# Patient Record
Sex: FEMALE | Race: WHITE | Employment: FULL TIME | URBAN - METROPOLITAN AREA
[De-identification: names, ages, dates, MRNs, and addresses within clinical notes are randomized per-mention and may not be internally consistent; named-entity substitution may affect disease eponyms.]

---

## 2020-01-06 ENCOUNTER — APPOINTMENT (OUTPATIENT)
Dept: CT IMAGING | Facility: CLINIC | Age: 35
End: 2020-01-06
Attending: EMERGENCY MEDICINE
Payer: COMMERCIAL

## 2020-01-06 ENCOUNTER — HOSPITAL ENCOUNTER (OUTPATIENT)
Facility: CLINIC | Age: 35
Setting detail: OBSERVATION
Discharge: HOME | End: 2020-01-08
Attending: EMERGENCY MEDICINE | Admitting: INTERNAL MEDICINE
Payer: COMMERCIAL

## 2020-01-06 DIAGNOSIS — R79.89 ELEVATED TROPONIN: ICD-10-CM

## 2020-01-06 DIAGNOSIS — R07.9 CHEST PAIN, UNSPECIFIED TYPE: Primary | ICD-10-CM

## 2020-01-06 DIAGNOSIS — R07.9 CHEST PAIN, UNSPECIFIED TYPE: ICD-10-CM

## 2020-01-06 LAB
ALBUMIN SERPL-MCNC: 4.2 G/DL (ref 3.4–5)
ALP SERPL-CCNC: 53 U/L (ref 40–150)
ALT SERPL W P-5'-P-CCNC: 24 U/L (ref 0–50)
ANION GAP SERPL CALCULATED.3IONS-SCNC: 3 MMOL/L (ref 3–14)
AST SERPL W P-5'-P-CCNC: 23 U/L (ref 0–45)
B-HCG FREE SERPL-ACNC: <5 IU/L
BASOPHILS # BLD AUTO: 0 10E9/L (ref 0–0.2)
BASOPHILS NFR BLD AUTO: 1 %
BILIRUB SERPL-MCNC: 0.7 MG/DL (ref 0.2–1.3)
BUN SERPL-MCNC: 16 MG/DL (ref 7–30)
CALCIUM SERPL-MCNC: 8.9 MG/DL (ref 8.5–10.1)
CHLORIDE SERPL-SCNC: 113 MMOL/L (ref 94–109)
CO2 SERPL-SCNC: 25 MMOL/L (ref 20–32)
CREAT SERPL-MCNC: 1.01 MG/DL (ref 0.52–1.04)
D DIMER PPP FEU-MCNC: 0.6 UG/ML FEU (ref 0–0.5)
DIFFERENTIAL METHOD BLD: ABNORMAL
EOSINOPHIL # BLD AUTO: 0.1 10E9/L (ref 0–0.7)
EOSINOPHIL NFR BLD AUTO: 1.8 %
ERYTHROCYTE [DISTWIDTH] IN BLOOD BY AUTOMATED COUNT: 12.3 % (ref 10–15)
GFR SERPL CREATININE-BSD FRML MDRD: 73 ML/MIN/{1.73_M2}
GLUCOSE SERPL-MCNC: 96 MG/DL (ref 70–99)
HCT VFR BLD AUTO: 37.9 % (ref 35–47)
HGB BLD-MCNC: 13 G/DL (ref 11.7–15.7)
IMM GRANULOCYTES # BLD: 0 10E9/L (ref 0–0.4)
IMM GRANULOCYTES NFR BLD: 0 %
INTERPRETATION ECG - MUSE: NORMAL
LIPASE SERPL-CCNC: 95 U/L (ref 73–393)
LYMPHOCYTES # BLD AUTO: 1 10E9/L (ref 0.8–5.3)
LYMPHOCYTES NFR BLD AUTO: 25.6 %
MCH RBC QN AUTO: 30.2 PG (ref 26.5–33)
MCHC RBC AUTO-ENTMCNC: 34.3 G/DL (ref 31.5–36.5)
MCV RBC AUTO: 88 FL (ref 78–100)
MONOCYTES # BLD AUTO: 0.5 10E9/L (ref 0–1.3)
MONOCYTES NFR BLD AUTO: 13.3 %
NEUTROPHILS # BLD AUTO: 2.3 10E9/L (ref 1.6–8.3)
NEUTROPHILS NFR BLD AUTO: 58.3 %
NRBC # BLD AUTO: 0 10*3/UL
NRBC BLD AUTO-RTO: 0 /100
PLATELET # BLD AUTO: 172 10E9/L (ref 150–450)
POTASSIUM SERPL-SCNC: 3.4 MMOL/L (ref 3.4–5.3)
PROT SERPL-MCNC: 7.2 G/DL (ref 6.8–8.8)
RBC # BLD AUTO: 4.31 10E12/L (ref 3.8–5.2)
SODIUM SERPL-SCNC: 141 MMOL/L (ref 133–144)
TROPONIN I SERPL-MCNC: 0.06 UG/L (ref 0–0.04)
TROPONIN I SERPL-MCNC: 0.07 UG/L (ref 0–0.04)
TROPONIN I SERPL-MCNC: 0.09 UG/L (ref 0–0.04)
WBC # BLD AUTO: 3.9 10E9/L (ref 4–11)

## 2020-01-06 PROCEDURE — 25800030 ZZH RX IP 258 OP 636: Performed by: EMERGENCY MEDICINE

## 2020-01-06 PROCEDURE — 84484 ASSAY OF TROPONIN QUANT: CPT | Mod: 91 | Performed by: NURSE PRACTITIONER

## 2020-01-06 PROCEDURE — 25000128 H RX IP 250 OP 636: Performed by: EMERGENCY MEDICINE

## 2020-01-06 PROCEDURE — 84702 CHORIONIC GONADOTROPIN TEST: CPT

## 2020-01-06 PROCEDURE — 84484 ASSAY OF TROPONIN QUANT: CPT | Performed by: EMERGENCY MEDICINE

## 2020-01-06 PROCEDURE — 99203 OFFICE O/P NEW LOW 30 MIN: CPT | Performed by: INTERNAL MEDICINE

## 2020-01-06 PROCEDURE — 99285 EMERGENCY DEPT VISIT HI MDM: CPT | Mod: 25

## 2020-01-06 PROCEDURE — 36415 COLL VENOUS BLD VENIPUNCTURE: CPT | Performed by: NURSE PRACTITIONER

## 2020-01-06 PROCEDURE — 99220 ZZC INITIAL OBSERVATION CARE,LEVL III: CPT | Performed by: NURSE PRACTITIONER

## 2020-01-06 PROCEDURE — 96361 HYDRATE IV INFUSION ADD-ON: CPT

## 2020-01-06 PROCEDURE — 71275 CT ANGIOGRAPHY CHEST: CPT

## 2020-01-06 PROCEDURE — 96360 HYDRATION IV INFUSION INIT: CPT | Mod: 59

## 2020-01-06 PROCEDURE — 83690 ASSAY OF LIPASE: CPT | Performed by: EMERGENCY MEDICINE

## 2020-01-06 PROCEDURE — 80053 COMPREHEN METABOLIC PANEL: CPT | Performed by: EMERGENCY MEDICINE

## 2020-01-06 PROCEDURE — G0378 HOSPITAL OBSERVATION PER HR: HCPCS

## 2020-01-06 PROCEDURE — 85025 COMPLETE CBC W/AUTO DIFF WBC: CPT | Performed by: EMERGENCY MEDICINE

## 2020-01-06 PROCEDURE — 93005 ELECTROCARDIOGRAM TRACING: CPT

## 2020-01-06 PROCEDURE — 85379 FIBRIN DEGRADATION QUANT: CPT | Performed by: EMERGENCY MEDICINE

## 2020-01-06 PROCEDURE — 25000125 ZZHC RX 250: Performed by: EMERGENCY MEDICINE

## 2020-01-06 RX ORDER — IOPAMIDOL 755 MG/ML
54 INJECTION, SOLUTION INTRAVASCULAR ONCE
Status: COMPLETED | OUTPATIENT
Start: 2020-01-06 | End: 2020-01-06

## 2020-01-06 RX ORDER — AMLODIPINE BESYLATE 10 MG/1
10 TABLET ORAL DAILY
Status: DISCONTINUED | OUTPATIENT
Start: 2020-01-07 | End: 2020-01-08 | Stop reason: HOSPADM

## 2020-01-06 RX ORDER — ASPIRIN 81 MG/1
324 TABLET, CHEWABLE ORAL ONCE
Status: DISCONTINUED | OUTPATIENT
Start: 2020-01-06 | End: 2020-01-06

## 2020-01-06 RX ORDER — NITROGLYCERIN 0.4 MG/1
0.4 TABLET SUBLINGUAL EVERY 5 MIN PRN
Status: DISCONTINUED | OUTPATIENT
Start: 2020-01-06 | End: 2020-01-08 | Stop reason: HOSPADM

## 2020-01-06 RX ORDER — NALOXONE HYDROCHLORIDE 0.4 MG/ML
.1-.4 INJECTION, SOLUTION INTRAMUSCULAR; INTRAVENOUS; SUBCUTANEOUS
Status: DISCONTINUED | OUTPATIENT
Start: 2020-01-06 | End: 2020-01-08 | Stop reason: HOSPADM

## 2020-01-06 RX ORDER — LIDOCAINE 40 MG/G
CREAM TOPICAL
Status: DISCONTINUED | OUTPATIENT
Start: 2020-01-06 | End: 2020-01-08 | Stop reason: HOSPADM

## 2020-01-06 RX ORDER — POLYETHYLENE GLYCOL 3350 17 G/17G
1 POWDER, FOR SOLUTION ORAL DAILY PRN
COMMUNITY

## 2020-01-06 RX ORDER — ACETAMINOPHEN 325 MG/1
650 TABLET ORAL EVERY 4 HOURS PRN
Status: DISCONTINUED | OUTPATIENT
Start: 2020-01-06 | End: 2020-01-08

## 2020-01-06 RX ORDER — AMLODIPINE BESYLATE 10 MG/1
10 TABLET ORAL DAILY
COMMUNITY
Start: 2019-10-31

## 2020-01-06 RX ORDER — ASPIRIN 81 MG/1
81 TABLET ORAL DAILY
Status: DISCONTINUED | OUTPATIENT
Start: 2020-01-07 | End: 2020-01-08 | Stop reason: HOSPADM

## 2020-01-06 RX ORDER — ASPIRIN 81 MG/1
81 TABLET ORAL DAILY
COMMUNITY

## 2020-01-06 RX ORDER — MEDROXYPROGESTERONE ACETATE 150 MG/ML
150 INJECTION, SUSPENSION INTRAMUSCULAR
COMMUNITY
Start: 2019-03-11

## 2020-01-06 RX ORDER — ACETAMINOPHEN 650 MG/1
650 SUPPOSITORY RECTAL EVERY 4 HOURS PRN
Status: DISCONTINUED | OUTPATIENT
Start: 2020-01-06 | End: 2020-01-08 | Stop reason: HOSPADM

## 2020-01-06 RX ORDER — ALUMINA, MAGNESIA, AND SIMETHICONE 2400; 2400; 240 MG/30ML; MG/30ML; MG/30ML
30 SUSPENSION ORAL EVERY 4 HOURS PRN
Status: DISCONTINUED | OUTPATIENT
Start: 2020-01-06 | End: 2020-01-08 | Stop reason: HOSPADM

## 2020-01-06 RX ORDER — MULTIPLE VITAMINS W/ MINERALS TAB 9MG-400MCG
1 TAB ORAL DAILY
COMMUNITY

## 2020-01-06 RX ADMIN — SODIUM CHLORIDE 1000 ML: 9 INJECTION, SOLUTION INTRAVENOUS at 06:34

## 2020-01-06 RX ADMIN — IOPAMIDOL 54 ML: 755 INJECTION, SOLUTION INTRAVENOUS at 07:47

## 2020-01-06 RX ADMIN — SODIUM CHLORIDE 82 ML: 9 INJECTION, SOLUTION INTRAVENOUS at 07:47

## 2020-01-06 ASSESSMENT — MIFFLIN-ST. JEOR: SCORE: 1162.21

## 2020-01-06 NOTE — ED PROVIDER NOTES
History     Chief Complaint:  Chest Pain      HPI   Thao Combs is a 34 year old female with a history of HTN who presents to the emergency department via EMS for evaluation of chest pain. 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 She subsequently began to have bilateral tinnitus, chills, and lightheadedness. Patient then took her blood pressure medication but continued to have pain. She then called EMS at 520am, who gave her aspirin and sublingual nitroglycerin, single dose. She denies leg swelling and history of blood clots. Of note patient had a left kidney biopsy on 12/24/19. Also of note patient reports having high blood pressure for 2-3 years.  She specifically denies any prior history of cardiac disease.  There is some question of whether she has lupus anticoagulant.  Symptoms lasted about 15 minutes before resolving.  No prior similar symptoms with activity.    Allergies:  Estrogens  Nitrofurantoin    Medications:    amlodipine  medroxyprogesterone  aspirin   etonogestrel    Past Medical History:    Anti-phospholipid antibody syndrome  HTN  Lupus  UTI  nexplanon inserted    Past Surgical History:    Appendectomy  Colposcopy cervix, biopsy cervix, endocervical curettage, combined  Cervical leep tx  Lipoma back  Septoplasty  Tonsillectomy    Family History:    Depression - mother  Heart disease - father    Social History:  Smoking status: former smoker  Alcohol use: yes  Drug use: no  The patient presents to the emergency department via EMS.  Marital Status:  Single   Patient works at [employer].    Review of Systems   All other systems reviewed and are negative.    Physical Exam     Patient Vitals for the past 24 hrs:   BP Temp Temp src Pulse Heart Rate Resp SpO2 Height Weight   01/06/20 0800 (!) 126/91 -- -- 87 90 20 100 % -- --   01/06/20 0745 (!) 131/101 -- -- -- --  "-- -- -- --   01/06/20 0730 -- -- -- -- 64 24 98 % -- --   01/06/20 0700 122/89 -- -- 71 67 10 99 % -- --   01/06/20 0618 (!) 126/98 -- -- 75 75 10 97 % -- --   01/06/20 0603 (!) 132/102 98.3  F (36.8  C) Oral -- 82 -- 100 % 1.575 m (5' 2\") 49.9 kg (110 lb)       Physical Exam  General:  nontoxic-appearing woman sitting upright in room 24  HENT: mucous membranes moist  CV: regular rate, regular rhythm, no lower extremity edema, no JVD, palpable symmetric radial pulses  Resp: clear throughout, normal effort, no crackles or wheezing  GI: abdomen soft and nontender, no guarding, negative Garsia's sign  MSK: no bony tenderness to chest  Skin: appropriately warm and dry, no erythema or vesicles to chest wall  Neuro: alert, clear speech, oriented   Psych: cooperative     Emergency Department Course   ECG (6:07:47):  Indication: Chest Pain  Rate 63 bpm. IN interval 126. QRS duration 88. QT/QTc 404/413. P-R-T axes 58 52 45. Normal sinus rhythm. IRBBB. Interpreted at 0725 by Zuhair Pena MD.    Imaging:  Radiographic findings were communicated with the patient who voiced understanding of the findings.  CT Chest Pulmonary embolism w/ contrast   IMPRESSION: Unremarkable CT scan of the chest. No evidence of  pulmonary embolism. as per radiology.       Laboratory:  CBC: WBC: 3.9 (L), HGB: 13.0, PLT: 172  CMP: Glucose 96, chloride 113 (H), o/w WNL (Creatinine: 1.01)  D-dimer: 0.6 (H)  0629 Troponin: 0.061 (H)  Lipase: 95  ISTAT HCG quantitative pregnancy POCT: <5.0    Interventions:  634 NS 1000 mL IV    Emergency Department Course:  Nursing notes and vitals reviewed. (836) I performed an exam of the patient as documented above.     IV inserted. Medicine administered as documented above. Blood drawn. This was sent to the lab for further testing, results above.     The patient was sent for a chest CT while in the emergency department, findings above.     I performed electronic chart review in [facility].  The patient was " placed on continuous cardiac and pulse ox monitoring.    An EKG was recorded. Results as noted above.     813 I rechecked the patient and discussed the results of her workup thus far.     818  I consulted with Dr. Alberts of the hospitalist services. He is in agreement to accept the patient for admission.    Findings and plan explained to the Patient who consents to admission. Discussed the patient with Dr. Alberts, who will admit the patient to a obs bed for further monitoring, evaluation, and treatment.      Impression & Plan    Medical Decision Making:  While her description of today's acute chest pain episode is not highly suspicious for an acute coronary syndrome, especially given her fairly young age, her troponin is minimally elevated, and this was drawn quite soon after onset of symptoms.  She is feeling well at this time, without ongoing symptoms.   alternate etiologies were considered including pulmonary embolism though CT of the chest is reassuring in this regard.  No signs of structural disease such as pneumothorax or pneumonia.  I do not think that she requires heparinization at this time, and she received aspirin by EMS.  However, given her abnormal troponin, in the setting of history of hypertension and possible rheumatologic disease, I think that hospitalization for serial troponins and consideration of further provocative cardiac testing is indicated.  These findings and the rationale for hospitalization were discussed with the patient who consents.  She was admitted to a monitored bed under the care of the hospitalist service.    Diagnosis:    ICD-10-CM    1. Chest pain, unspecified type R07.9    2. Elevated troponin R79.89        Disposition:  Admitted to obs    Timmy Trejo  1/6/2020    EMERGENCY DEPARTMENT  Scribe Disclosure:  I, Timmy Trejo, am serving as a scribe at 7:26 AM on 1/6/2020 to document services personally performed by Zuhair Pena MD based on my observations and the  provider's statements to me.     This record was created at least in part using electronic voice recognition software, so please excuse any typographical errors.       Zuhair Pena MD  01/06/20 8349

## 2020-01-06 NOTE — ED NOTES
"[facility]  ED Nurse Handoff Report    ED Chief complaint: Chest Pain      ED Diagnosis:   Final diagnoses:   Chest pain, unspecified type   Elevated troponin       Code Status: To be addressed by admitting MD.     Allergies:   Allergies   Allergen Reactions     Bee Venom Swelling     Bees      Blood-Group Specific Substance Other (See Comments)     Patient has a Non-Specific Antibody.  Blood products may be delayed.     Estrogens      Nitrofurantoin Nausea and Vomiting     Wasp Venom Protein      Other reaction(s): Edema  large localized reaction       Patient Story: Pt presents to the ED for chest pain. 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 Pt then had a near syncopal episode.   Focused Assessment: Pt reported SOB and CP on arrival to ED. Pt received ASA and nitroglycerin from EMS. Pt repots taking an amlodipine daily for HTN. Pt does take hormonal birth control. Pt has an elevated D-dimer: 0.6. Pt has an elevated troponin: 0.061. Pt in line for CT of chest. Pt is alert and oriented x4. Pt able to ambulate independently in ED.      Treatments and/or interventions provided: Labs, CT, continuous cardiac monitoring.   Patient's response to treatments and/or interventions: Pt reports decreased pain.     To be done/followed up on inpatient unit:  Cardiac monitoring, repeat labs.     Does this patient have any cognitive concerns?: NA    Activity level - Baseline/Home:  Independent  Activity Level - Current:   Independent    Patient's Preferred language: English   Needed?: No    Isolation: None  Infection: Not Applicable  Bariatric?: No    Vital Signs:   Vitals:    01/06/20 0603 01/06/20 0618 01/06/20 0700   BP: (!) 132/102 (!) 126/98 122/89   Pulse:  75 71   Resp:  10 10   Temp: 98.3  F (36.8  C)     TempSrc: Oral     SpO2: 100% 97% 99%   Weight: 49.9 kg (110 lb)     Height: 1.575 m (5' 2\")         Cardiac Rhythm:Cardiac Rhythm: " Normal sinus rhythm    Was the PSS-3 completed:   Yes  What interventions are required if any?  Frequent rounding             Family Comments: No family at bedside in ED  OBS brochure/video discussed/provided to patient/family: Yes              Name of person given brochure if not patient: NA              Relationship to patient: NA    For the majority of the shift this patient's behavior was Green.   Behavioral interventions performed were Frequent rounding.    ED NURSE PHONE NUMBER: [id number]

## 2020-01-06 NOTE — H&P
[facility]    History and Physical - Hospitalist Service       Date of Admission:  1/6/2020    Assessment & Plan   Thao Combs is a 34 year old female with a past medical history of hypertension, CKD stage II, and antiphospholipid syndrome admitted on 1/6/2020 with chest pain and elevation in troponin.     Chest Pain  Troponin 0.061. D-dimer 0.6. CT chest for PE protocol negative  EKG reviewed, incomplete RBBB.  Patient reports a sudden onset stabbing chest pain which was localized to midsternal area which slightly radiated to the right side of the sternum.  Patient reports she tried breathing exercises to alleviate symptoms when she became hot, diaphoretic, developed tinnitus, and became dizzy.  At which point, the patient reports she started to panic and felt as if her heart were racing and she became short of breath.  At this time she dialed 911.  On EMS arrival she was provided with aspirin 324 mg p.o. and nitroglycerin spray with relief in symptoms.  Of note, the patient reports she was lifting weights on Saturday. She completed a chest routine which is unusual for the patient. The pain is not reproducible on examination.  -Admit to OBS  -Telemetry monitoring  -Serial Troponin  -No caffeine diet  -Nuclear medicine exercise stress test  -Nitroglycerin subinguinal 0.4mg PO PRN for chest pain   -ASA 81mg PO daily    Hypertension, history of   -Continue PTA Amlodipine 10mg PO daily    CKD, stage II  --Proteinuria  --Kidney biopsy 12/24/2019  Biopsy results pending. Patient has an outpatient appt with her nephrologist on 1/8/2020. Patient reports preliminary results were called to her and negative.  Renal function at baseline Creatinine 1.01; GFR 73  -Avoid NSAIDS    Autoimmune process  --Positive Lupus anticoagulant  --Questionable Anti-phospholipid syndrome  Patient diagnosis with anti-phospholipid syndrome at 19. However, patient follows Dr. Russell, rheumatology, outpatient per note on  10/31/2019 he does not believe that she has anti-phospholipid syndrome. LORENA negative, normal C3, C4, negative anti-phospholipid antibiody. Positive lupus anticoagulant.  She did have a stillbirth at 24 weeks gestation in 2012. Currently, using depo injection for birth control.  -Continue PTA ASA 81mg PO daily       Diet: Regular diet, no caffeine  DVT Prophylaxis: Low Risk/Ambulatory with no VTE prophylaxis indicated  Zamora Catheter: not present  Code Status: Full    Disposition Plan   Expected discharge: Tomorrow, recommended to prior living arrangement once adequate pain management/ tolerating PO medications and and completion of stress test..  Entered: CLYDE Meza CNP 01/06/2020, 8:22 AM     The patient's care was discussed with the Attending Physician, Dr. Alberts.    CLYDE Meza CNP  [facility]    ______________________________________________________________________    Chief Complaint   Chest pain    History is obtained from the patient and electronic health record    History of Present Illness   Thao Combs is a 34 year old female with a past medical history of hypertension, CKD stage II, and antiphospholipid syndrome admitted on 1/6/2020 with chest pain and elevation in troponin. 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  Patient reports she tried breathing exercises to alleviate symptoms when she became hot, diaphoretic, developed tinnitus, and became dizzy.  At which point, the patient reports she started to panic and felt as if her heart were racing and she became short of breath and she called 911.  On EMS arrival she was provided with aspirin 324 mg p.o. and nitroglycerin spray with relief in symptoms.  The patient reports that she does exercise frequently which includes weightlifting routines.   She states Saturday she completed her weightlifting routine for chest muscles which is an abnormal routine for her, however the pain is not reproducible.  Patient does endorse intermittent heartburn and nausea for which she will occasionally take omeprazole.  She reports she has not taken any omeprazole recently.  She denies any melena or bright red blood in stool. She denies vomiting, diarrhea, fever, chills, shortness of breath, or decrease in exercise tolerance.    Review of Systems    The 10 point Review of Systems is negative other than noted in the HPI or here.     Past Medical History    I have reviewed this patient's medical history and updated it with pertinent information if needed.   Past Medical History:   Diagnosis Date     Anti-phospholipid antibody syndrome (H)      Contraception     h/o iud     Hypertension      Lupus (systemic lupus erythematosus) (H) 2005     Routine general medical examination at a health care facility     vivienne--primary MD/[facility]       Past Surgical History   I have reviewed this patient's surgical history and updated it with pertinent information if needed.  Past Surgical History:   Procedure Laterality Date     APPENDECTOMY       COLPOSCOPY CERVIX, BIOPSY CERVIX, ENDOCERVICAL CURETTAGE, COMBINED      polyp-benign     LEEP TX, CERVICAL       lipoma back  2013     septoplasty       TONSILLECTOMY         Social History   I have reviewed this patient's social history and updated it with pertinent information if needed.  Social History     Tobacco Use     Smoking status: Former Smoker     Smokeless tobacco: Never Used     Tobacco comment: occ hooka   Substance Use Topics     Alcohol use: Yes     Drug use: No       Family History   I have reviewed this patient's family history and updated it with pertinent information if needed.   Family History   Problem Relation Age of Onset     Gynecology Mother      Psychotic Disorder Mother         depression     Diabetes Maternal Grandmother       C.A.D. Maternal Grandmother      Psychotic Disorder Maternal Grandmother      Heart Disease Maternal Grandfather        Prior to Admission Medications   Prior to Admission Medications   Prescriptions Last Dose Informant Patient Reported? Taking?   amLODIPine (NORVASC) 10 MG tablet   Yes Yes   Sig: Take 10 mg by mouth   aspirin 81 MG EC tablet   Yes No   Sig: Take 81 mg by mouth   etonogestrel (IMPLANON/NEXPLANON) 68 MG IMPL   No No   Si each (68 mg) by Subdermal route once for 1 dose   medroxyPROGESTERone (DEPO-PROVERA) 150 MG/ML IM injection   Yes Yes   Sig: Inject 150 mg into the muscle      Facility-Administered Medications: None     Allergies   Allergies   Allergen Reactions     Bee Venom Swelling     Bees      Blood-Group Specific Substance Other (See Comments)     Patient has a Non-Specific Antibody.  Blood products may be delayed.     Estrogens      Nitrofurantoin Nausea and Vomiting     Wasp Venom Protein      Other reaction(s): Edema  large localized reaction       Physical Exam   Vital Signs: Temp: 98.3  F (36.8  C) Temp src: Oral BP: (!) 126/91 Pulse: 87 Heart Rate: 90 Resp: 20 SpO2: 100 % O2 Device: None (Room air)    Weight: 110 lbs 0 oz    Constitutional: Awake, alert, cooperative, no apparent distress.  Eyes: Conjunctiva and pupils examined and normal.  HEENT: Moist mucous membranes, normal dentition.  Respiratory: Clear to auscultation bilaterally, no crackles or wheezing.  Cardiovascular: Regular rate and rhythm, normal S1 and S2, and no murmur noted.  GI: Soft, non-distended, non-tender, normal bowel sounds.  Lymph/Hematologic: No anterior cervical or supraclavicular adenopathy.  Skin: No rashes, no cyanosis, no edema.  Musculoskeletal: No joint swelling, erythema or tenderness.  Neurologic: Cranial nerves 2-12 intact, normal strength and sensation.  Psychiatric: Alert, oriented to person, place and time, no obvious anxiety or depression.      Data   Data reviewed today: I reviewed all  medications, new labs and imaging results over the last 24 hours. I personally reviewed the EKG tracing showing incomplete RBBB.    Recent Labs   Lab 01/06/20  0629   WBC 3.9*   HGB 13.0   MCV 88         POTASSIUM 3.4   CHLORIDE 113*   CO2 25   BUN 16   CR 1.01   ANIONGAP 3   TONO 8.9   GLC 96   ALBUMIN 4.2   PROTTOTAL 7.2   BILITOTAL 0.7   ALKPHOS 53   ALT 24   AST 23   LIPASE 95   TROPI 0.061*     CT chest with contrast pulmonary emboli      FINDINGS:  No evidence of pulmonary embolism or acute thoracic aortic  abnormality. No pneumothorax. No pleural or pericardial effusion. No  pulmonary nodule or mass. No thoracic or axillary adenopathy.  Visualized portions of the upper abdomen are unremarkable. No  worrisome bone lesions.                                                                      IMPRESSION: Unremarkable CT scan of the chest. No evidence of  pulmonary embolism.

## 2020-01-06 NOTE — PROGRESS NOTES
Observation goals PRIOR TO DISCHARGE    Comments: List all goals to be met before discharge home:   - Serial troponins and stress test complete. -PENDING  - Seen and cleared by consultant if applicable -PENDING  - Adequate pain control on oral analgesia -MET  - Vital signs normal or at patient baseline -MET  - Safe disposition plan has been identified -MET  - Nurse to notify provider when observation goals have been met and patient is ready for discharge.

## 2020-01-06 NOTE — ED TRIAGE NOTES
Patient was at work working at a  of a gym when she got a sharp, stabbing pain to substernal chest and had a near syncopal episode. Pt reported SOB with the chest pain. Per EMS pt was hyperventilating. Pt received aspirin and nitroglycerin pta. Pt denies CP or SOB at this time- just reporting dizziness. She takes amlodipine daily for HTN- did not take it yesterday- no recent medication changes.

## 2020-01-06 NOTE — PROVIDER NOTIFICATION
MD Notification    Notified Person: MD    Notified Person Name: Dr. Alberts    Notification Date/Time: 1/6/20; 1615    Notification Interaction: face to face notification    Purpose of Notification: 3rd troponin elevated at 0.093    Orders Received: MD to place cards consult.     Comments:

## 2020-01-06 NOTE — PHARMACY-ADMISSION MEDICATION HISTORY
Pharmacy Medication History  Admission medication history interview status for the 1/6/2020  admission is complete. See EPIC admission navigator for prior to admission medications     Medication history sources: Patient, Surescripts and Care Everywhere  Medication history source reliability: Good  Adherence assessment: Good    Significant changes made to the medication list:        Additional medication history information:   ---  Pt stopped taking omeprazole ~ 2 weeks ago b/c she ran out.    Medication reconciliation completed by provider prior to medication history? No    Time spent in this activity: 15 minutes      Prior to Admission medications    Medication Sig Last Dose Taking? Auth Provider   amLODIPine (NORVASC) 10 MG tablet Take 10 mg by mouth daily  1/6/2020 at am Yes Reported, Patient   aspirin 81 MG EC tablet Take 81 mg by mouth daily  1/3/2020 at am Yes Reported, Patient   medroxyPROGESTERone (DEPO-PROVERA) 150 MG/ML IM injection Inject 150 mg into the muscle every 3 months  due 3rd week of Jan Yes Reported, Patient   multivitamin w/minerals (THERA-VIT-M) tablet Take 1 tablet by mouth daily chewable 1/6/2020 at am Yes Unknown, Entered By History   omeprazole (PRILOSEC) 20 MG DR capsule Take 20 mg by mouth daily 2 weeks ago Yes Unknown, Entered By History   polyethylene glycol (MIRALAX/GLYCOLAX) packet Take 1 packet by mouth daily as needed for constipation Past Week Yes Unknown, Entered By History

## 2020-01-06 NOTE — PROGRESS NOTES
MD Notification    Notified Person: MD    Notified Person Name: MD Aristeo    Notification Date/Time: 1/6/2020 2:02    Notification Interaction: web paged    Purpose of Notification: re: Stress test and Echo    Orders Received:    Comments: Patient wants to go home and do the tests as an OP. Pt. and mother informed of the Troponin trending up.

## 2020-01-06 NOTE — PROGRESS NOTES
RECEIVING UNIT ED HANDOFF REVIEW    ED Nurse Handoff Report was reviewed by: Oswaldo Gonzalez RN on January 6, 2020 at 8:59 AM

## 2020-01-06 NOTE — PROGRESS NOTES
END OF SHIFT REPORT :          DATE & TIME: January 6, 2020        SHIFT: 7 AM- 3 PM     Thao Combs [id number] 34 year old female was admitted  on 1/6/2020  due to Chest Pain    Patient is:Full Code. ISOLATION:No. : No. Diet: Regular No Caffeine Diet    Patient's recent vital signs were:   Temp: 98.6  F (37  C) BP: 111/76 Pulse: 90 Heart Rate: 87 Resp: 20 ,   SpO2: 98 %,O2 Device: None (Room air)     Patient is alert and oriented x  , scores green on behavior & aggression tool color. CI WA is 0. Patient is up with Independent to the bathroom .  Skin is intact, IV is SL,and is  not  in pain.  Patient has a no drain and is on telemetry in SR rhythm.    ABNORMAL LAB Troponin slightly elevated x 2; d-dimer slightly elevated  CONSULT/S: none  GOALS/PLAN: ECHO today

## 2020-01-06 NOTE — PLAN OF CARE
Observation goals PRIOR TO DISCHARGE     Comments: List all goals to be met before discharge home:   - Serial troponins and stress test complete: Not met, stress test in the am.   - Seen and cleared by consultant if applicable: Not met  - Adequate pain control on oral analgesia: Met  - Vital signs normal or at patient baseline: Met  - Safe disposition plan has been identified: Not met  - Nurse to notify provider when observation goals have been met and patient is ready for discharge.

## 2020-01-06 NOTE — PROGRESS NOTES
Thao Combs, 34 year old,female  1/6/2020 due to Chest Pain  .   Full Code. ISOLATION:No. : No. LEVEL: Independent arrived from emergency Room via cart accompanied by NA.  Patient was transferred to bed and was oriented to room and controls.  Observation welcome packet was given. Belongings clothing remained with patient. Vital signs stable.

## 2020-01-07 ENCOUNTER — APPOINTMENT (OUTPATIENT)
Dept: CARDIOLOGY | Facility: CLINIC | Age: 35
End: 2020-01-07
Attending: INTERNAL MEDICINE
Payer: COMMERCIAL

## 2020-01-07 ENCOUNTER — APPOINTMENT (OUTPATIENT)
Dept: CARDIOLOGY | Facility: CLINIC | Age: 35
End: 2020-01-07
Attending: PHYSICIAN ASSISTANT
Payer: COMMERCIAL

## 2020-01-07 LAB
BUN SERPL-MCNC: 16 MG/DL (ref 7–30)
CALCIUM SERPL-MCNC: 8.7 MG/DL (ref 8.5–10.1)
CHLORIDE SERPL-SCNC: 110 MMOL/L (ref 94–109)
CHOLEST SERPL-MCNC: 129 MG/DL
CO2 SERPL-SCNC: 21 MMOL/L (ref 20–32)
CREAT SERPL-MCNC: 0.97 MG/DL (ref 0.52–1.04)
GFR SERPL CREATININE-BSD FRML MDRD: 77 ML/MIN/{1.73_M2}
GLUCOSE SERPL-MCNC: 91 MG/DL (ref 70–99)
HDLC SERPL-MCNC: 48 MG/DL
LDLC SERPL CALC-MCNC: 73 MG/DL
MAGNESIUM SERPL-MCNC: 2.1 MG/DL (ref 1.6–2.3)
NONHDLC SERPL-MCNC: 81 MG/DL
POTASSIUM SERPL-SCNC: 4.2 MMOL/L (ref 3.4–5.3)
SODIUM SERPL-SCNC: 139 MMOL/L (ref 133–144)
TRIGL SERPL-MCNC: 41 MG/DL
TROPONIN I SERPL-MCNC: 0.1 UG/L (ref 0–0.04)
TSH SERPL DL<=0.005 MIU/L-ACNC: 2.62 MU/L (ref 0.4–4)

## 2020-01-07 PROCEDURE — G0378 HOSPITAL OBSERVATION PER HR: HCPCS

## 2020-01-07 PROCEDURE — 99214 OFFICE O/P EST MOD 30 MIN: CPT | Mod: 25 | Performed by: INTERNAL MEDICINE

## 2020-01-07 PROCEDURE — 25000128 H RX IP 250 OP 636: Performed by: INTERNAL MEDICINE

## 2020-01-07 PROCEDURE — 25500064 ZZH RX 255 OP 636: Performed by: INTERNAL MEDICINE

## 2020-01-07 PROCEDURE — 84484 ASSAY OF TROPONIN QUANT: CPT | Performed by: INTERNAL MEDICINE

## 2020-01-07 PROCEDURE — 93306 TTE W/DOPPLER COMPLETE: CPT | Mod: 26 | Performed by: INTERNAL MEDICINE

## 2020-01-07 PROCEDURE — 84443 ASSAY THYROID STIM HORMONE: CPT | Performed by: INTERNAL MEDICINE

## 2020-01-07 PROCEDURE — 93306 TTE W/DOPPLER COMPLETE: CPT

## 2020-01-07 PROCEDURE — 36415 COLL VENOUS BLD VENIPUNCTURE: CPT | Performed by: INTERNAL MEDICINE

## 2020-01-07 PROCEDURE — 75574 CT ANGIO HRT W/3D IMAGE: CPT | Mod: 26 | Performed by: INTERNAL MEDICINE

## 2020-01-07 PROCEDURE — 80061 LIPID PANEL: CPT | Performed by: INTERNAL MEDICINE

## 2020-01-07 PROCEDURE — 99207 ZZC CDG-MDM COMPONENT: MEETS MODERATE - UP CODED: CPT | Performed by: PHYSICIAN ASSISTANT

## 2020-01-07 PROCEDURE — 99226 ZZC SUBSEQUENT OBSERVATION CARE,LEVEL III: CPT | Performed by: PHYSICIAN ASSISTANT

## 2020-01-07 PROCEDURE — 25000132 ZZH RX MED GY IP 250 OP 250 PS 637: Performed by: NURSE PRACTITIONER

## 2020-01-07 PROCEDURE — 93005 ELECTROCARDIOGRAM TRACING: CPT

## 2020-01-07 PROCEDURE — 80048 BASIC METABOLIC PNL TOTAL CA: CPT | Performed by: INTERNAL MEDICINE

## 2020-01-07 PROCEDURE — 25000132 ZZH RX MED GY IP 250 OP 250 PS 637: Performed by: INTERNAL MEDICINE

## 2020-01-07 PROCEDURE — 83735 ASSAY OF MAGNESIUM: CPT | Performed by: INTERNAL MEDICINE

## 2020-01-07 PROCEDURE — 75574 CT ANGIO HRT W/3D IMAGE: CPT

## 2020-01-07 PROCEDURE — 25800030 ZZH RX IP 258 OP 636: Performed by: INTERNAL MEDICINE

## 2020-01-07 RX ORDER — IOPAMIDOL 755 MG/ML
500 INJECTION, SOLUTION INTRAVASCULAR ONCE
Status: COMPLETED | OUTPATIENT
Start: 2020-01-07 | End: 2020-01-07

## 2020-01-07 RX ORDER — METOPROLOL TARTRATE 50 MG
50-100 TABLET ORAL
Status: COMPLETED | OUTPATIENT
Start: 2020-01-07 | End: 2020-01-07

## 2020-01-07 RX ADMIN — ASPIRIN 81 MG: 81 TABLET, DELAYED RELEASE ORAL at 08:18

## 2020-01-07 RX ADMIN — IOPAMIDOL 210 ML: 755 INJECTION, SOLUTION INTRAVENOUS at 13:05

## 2020-01-07 RX ADMIN — SODIUM CHLORIDE 100 ML: 9 INJECTION, SOLUTION INTRAVENOUS at 13:05

## 2020-01-07 RX ADMIN — AMLODIPINE BESYLATE 10 MG: 5 TABLET ORAL at 08:18

## 2020-01-07 RX ADMIN — NITROGLYCERIN 0.4 MG: 0.4 TABLET SUBLINGUAL at 12:29

## 2020-01-07 RX ADMIN — METOPROLOL TARTRATE 100 MG: 50 TABLET, FILM COATED ORAL at 10:06

## 2020-01-07 RX ADMIN — HUMAN ALBUMIN MICROSPHERES AND PERFLUTREN 9 ML: 10; .22 INJECTION, SOLUTION INTRAVENOUS at 11:05

## 2020-01-07 RX ADMIN — NITROGLYCERIN 0.4 MG: 0.4 TABLET SUBLINGUAL at 12:26

## 2020-01-07 NOTE — PLAN OF CARE
PRIMARY DIAGNOSIS: CHEST PAIN  OUTPATIENT/OBSERVATION GOALS TO BE MET BEFORE DISCHARGE:  1. Ruled out acute coronary syndrome (negative or stable Troponin):  No  2. Pain Status: Pain free.  3. Appropriate provocative testing performed: No  - Stress Test Procedure: CT Angio  - Interpretation of cardiac rhythm per telemetry tech: SR    4. Cleared by Consultants (if applicable):No  5. Return to near baseline physical activity: Yes  Discharge Planner Nurse   Safe discharge environment identified: No  Barriers to discharge: Yes       Entered by: Amparo Park 01/07/2020 8:40 AM     Please review provider order for any additional goals.   Nurse to notify provider when observation goals have been met and patient is ready for discharge.

## 2020-01-07 NOTE — PROGRESS NOTES
"[facility]    Medicine Progress Note - Hospitalist Service       Date of Admission:  1/6/2020    Assessment & Plan   Thao Combs is a 34 year old female with a past medical history of hypertension, CKD stage II, and question of antiphospholipid syndrome admitted on 1/6/2020 after an episode of dizziness, lightheadedness, and chest pain with elevation in troponin on ED evaluation. Registered to the observation unit for further evaluation and treatment. See formal note by Kylee Gannon CNP, on 01/06/20 for complete details on presentation.      Dizziness, lightheadedness, likely vagal episode   Atypical chest pain, transient  Elevated troponin: Arrived to work on day of admission, developed vertiginous symptoms \"room spinning\" and some associated lightheadedness with positional change. In the midst of this, reported a sudden onset stabbing chest pain which was localized to midsternal area which slightly radiated to the right side of the sternum.  Patient reports she tried breathing exercises to alleviate symptoms when she became hot, diaphoretic, developed tinnitus, and became dizzy at which point, the patient reports she started to panic and felt as if her heart were racing and she became short of breath.  Of note, the patient reports she was lifting weights on Saturday. She completed a chest routine which is unusual for the patient. The pain is not reproducible on examination.  EMS was called and on EMS arrival she was provided with aspirin 324 mg p.o. and nitroglycerin spray with relief in symptoms. Troponin 0.061>0.074>0.093>0.100. D-dimer 0.6. CT chest for PE protocol negative. EKG reviewed, incomplete RBBB.CMP, CBC unremarkable. Lipid profile 129/73/48/41. TSH and magnesium WNL. Echocardiogram with ER 55-60%, no significant valvular abnormalities.   - No recurrence of symptoms since admission   - Telemetry with NSR   - Cardiology consulted for elevated troponin, see initial consult by Dr." Candelario. CT coronary angiogram performed today, awaiting results.     ADDENDUM 1600: CT coronary angiogram reviewed    Overall technically challenging suboptimal study due to poor  opacification and motion artifacts. The left main, proximal to mid  LAD, proximal circumflex, proximal to distal RCA vessels overall look  patent without detectable stenosis. However mid to apical LAD is a  smaller caliber vessel, poorly opacified with motion artifact and  stenosis evaluation is suboptimal in this segment. After distal RCA no  PDA or AXEL vessels are visualized. The mid segment of obtuse marginal  1 and  second diagonal vessels have motion artifacts and stenosis  evaluation is suboptimal in these segments.    Discussed with Cardiology, Dr. Francis. Recommends coronary angiogram. Pt agreeable. See formal note by Dr. Francis. Radiology report for CT negative for acute pathology.      Hypertension: Continue PTA Amlodipine 10mg PO daily     CKD, stage II  Proteinuria: Pt had kidney biopsy 12/24/2019; biopsy results pending. Patient has an outpatient appt with her nephrologist on 1/8/2020. Patient reports preliminary results were called to her and negative. Renal function at baseline Creatinine 1.01; GFR 73  - Avoid NSAIDS     Autoimmune process  Positive Lupus anticoagulant  Questionable Anti-phospholipid syndrome: Patient diagnosed with anti-phospholipid syndrome at 19. Patient follows Dr. Russell of Rheumatology. Per most recent outpatient visit on 10/31/2019, he does not believe that she has anti-phospholipid syndrome. LORENA negative, normal C3, C4, negative anti-phospholipid antibiody. Positive lupus anticoagulant.  She did have a stillbirth at 24 weeks gestation in 2012. Currently, using depo injection for birth control.  - Continue PTA ASA 81mg PO daily    Diet: Regular Diet Adult    DVT Prophylaxis: Ambulate every shift  Zamora Catheter: not present  Code Status: Full Code      Disposition Plan   Expected discharge: Today, recommended  to prior living arrangement once CT coronary angiogram results are in .  Entered: Kylee Wolff PA-C 01/07/2020, 2:09 PM     The patient's care was discussed with the Attending Physician, Dr. Dinh, Bedside Nurse and Patient.    Kylee Wolff PA-C  Hospitalist Service  [facility]  ______________________________________________________________________    Interval History   No acute events overnight. Denies chest pain, dizziness, lightheadedness. Pt is very hungry.     Data reviewed today: I reviewed all medications, new labs and imaging results over the last 24 hours.     Physical Exam   Vital Signs: Temp: 98  F (36.7  C) Temp src: Oral BP: 108/71 Pulse: 95 Heart Rate: 65 Resp: 16 SpO2: 98 % O2 Device: None (Room air)    Weight: 110 lbs 0 oz    CONSTITUTIONAL: Pt laying in bed, dressed in hospital garb. Appears comfortable. Cooperative with interview.  HEENT: Normocephalic, atraumatic. Negative for conjunctival redness or scleral icterus.   CARDIOVASCULAR: RRR, no murmurs, rubs, or extra heart sounds appreciated. Pulses +2/4 and regular in upper and lower extremities, bilaterally.   RESPIRATORY: No increased work of breathing. CTA, bilat; no wheezes, rales, or rhonchi appreciated.  GASTROINTESTINAL:  Abdomen soft, non-distended. BS auscultated in all four quadrants. Negative for tenderness to palpation.  No masses or organomegaly noted.  MUSCULOSKELETAL: No gross deformities noted. Normal muscle tone.   HEMATOLOGIC/LYMPHATIC/IMMUNOLOGIC: Negative for lower extremity edema, bilaterally.  NEUROLOGIC: Alert and oriented to person, place, and time.  strength intact. No focal neuro deficits   SKIN: Warm, dry, intact. No jaundice noted. Negative for suspicious lesions, rashes, bruising, open sores or abrasions.     Data   Recent Labs   Lab 01/07/20  0244 01/06/20  1428 01/06/20  1126 01/06/20  0629   WBC  --   --   --  3.9*   HGB  --   --   --  13.0   MCV  --    --   --  88   PLT  --   --   --  172     --   --  141   POTASSIUM 4.2  --   --  3.4   CHLORIDE 110*  --   --  113*   CO2 21  --   --  25   BUN 16  --   --  16   CR 0.97  --   --  1.01   ANIONGAP  --   --   --  3   TONO 8.7  --   --  8.9   GLC 91  --   --  96   ALBUMIN  --   --   --  4.2   PROTTOTAL  --   --   --  7.2   BILITOTAL  --   --   --  0.7   ALKPHOS  --   --   --  53   ALT  --   --   --  24   AST  --   --   --  23   LIPASE  --   --   --  95   TROPI 0.100* 0.093* 0.074* 0.061*     Recent Results (from the past 24 hour(s))   Echocardiogram Complete    Narrative    [id number]  [id number]  [id number]  [id number]^CHRIS^LORI^[facility]  Echocardiography Laboratory  [address] [place] [address]        Name: LIZANDRO BOURNE  MRN: [id number]  : 1985  Study Date: 2020 10:37 AM  Age: 34 yrs  Gender: Female  Patient Location: [facility]  Reason For Study: Chest Pain  Ordering Physician: LORI PEREZ  Referring Physician: [facility]  Performed By: Vani Montelongo     BSA: 1.5 m2  Height: 63 in  Weight: 110 lb  BP: 125/88 mmHg  _____________________________________________________________________________  __        Procedure  Complete Portable Echo Adult. Optison (NDC #[id number]) given intravenously.  _____________________________________________________________________________  __        Interpretation Summary     The visual ejection fraction is estimated at 55-60%.  The right ventricle is normal in size and function.  No major valve disease.  Technically difficult study.  _____________________________________________________________________________  __        Left Ventricle  The left ventricle is normal in size. The visual ejection fraction is  estimated at 55-60%. Diastolic Doppler findings (E/E' ratio and/or other  parameters) suggest left ventricular filling pressures are normal.     Right Ventricle  The right ventricle is normal in size and function.      Atria  The left atrium is borderline dilated. The left atrial volume measurement is  unreliable due to technically difficult study. Right atrial size is normal.     Mitral Valve  The mitral valve leaflets are mildly thickened. There is trace mitral  regurgitation.        Tricuspid Valve  There is trace tricuspid regurgitation. Right ventricular systolic pressure  could not be approximated due to inadequate tricuspid regurgitation.     Aortic Valve  The aortic valve is trileaflet. No aortic regurgitation is present. No aortic  stenosis is present.     Pulmonic Valve  The pulmonic valve is not well visualized.     Vessels  The aortic root is normal size.     Pericardium  There is no pericardial effusion.     _____________________________________________________________________________  __  MMode/2D Measurements & Calculations  IVSd: 0.69 cm  LVIDd: 4.1 cm  LVIDs: 2.7 cm  LVPWd: 0.87 cm  FS: 35.3 %     LV mass(C)d: 94.9 grams  LV mass(C)dI: 63.3 grams/m2  LA dimension: 2.8 cm  asc Aorta Diam: 2.7 cm  LA Volume (BP): 35.0 ml  LA Volume Index (BP): 23.3 ml/m2  RWT: 0.42        Doppler Measurements & Calculations  MV E max norma: 85.9 cm/sec  MV A max norma: 101.0 cm/sec  MV E/A: 0.85  MV dec time: 0.22 sec     PA acc time: 0.20 sec  E/E' av.2  Lateral E/e': 5.9  Medial E/e': 10.5           _____________________________________________________________________________  __           Report approved by: Edgar Cruz 2020 01:02 PM        Medications       amLODIPine  10 mg Oral Daily     aspirin  81 mg Oral Daily     sodium chloride (PF)  10 mL Intravenous Once     sodium chloride (PF)  10 mL Intravenous Once     sodium chloride (PF)  3 mL Intracatheter Q8H

## 2020-01-07 NOTE — CONSULTS
Cardiology Consultation      Thao Combs MRN# [id number]   YOB: 1985 Age: 34 year old   Date of Admission: 1/6/2020           Assessment and Plan:     1.  Troponin rise  2.  Likely vagal episode  3.  Vertigo  4.  Hypertension    As I discussed with Ms. Combs, she likely had a vertiginous episode this morning.  That may have induced a vagal episode and as a result, she developed chest discomfort as well as light-headedness and the sensation of warmth.  The trivial rise in the troponin is likely a result of transient hypotension associated with a vagal episode.  As I explained to Ms. Combs, the primary differential is spontaneous coronary artery dissection though I would expect a higher troponin level, more prolonged symptoms and possibly an abnormal EKG.  Since she may have a recurrence of vagal symptoms in the future, I recommended CT coronary angiography and would cancel the already ordered stress echocardiogram.    It is also possible that the amlodipine taken at the onset of the vertiginous episode then potentiated and prolonged the vagal symptoms which subsequently occurred.  Nonetheless, aggressive treatment of hypertension is very appropriate given her young age and relatively young age of hypertension onset.         History of Present Illness:     Ms. Thao Combs is 34 years old and has been admitted to the observation unit [facility] after an episode of chest pain today.  Cardiology consultation has been requested by Dr. Alberts of the hospitalist service.    Ms. Combs has no prior history of heart disease.  She does have hypertension and has been treated for about 3 years with amlodipine 10 mg daily.  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  She typically arises at 330 and had slept well the prior evening.  When she arrived today, she noted that she was vertiginous or as she stated, things  were spinning as has happened before.  She then noted that she was hot and took off a layer of clothing, then she was cold.  She went to take her amlodipine.  She returned to her desk and when she stood up, felt very light-headed.  She was experiencing both vertigo and light-headedness.  She tried to crouch down but still felt unwell and then she also noted the onset of sharp mid-sternal chest discomfort.  There was no radiation and she was not short of breath.  She does not remember she was nauseated.    A coworker helped her but the symptom did not pass and so EMS was summoned.  She remembers receiving 2 sublingual nitroglycerin sprays from the paramedics.  Her discomfort resolved sometime before she reached the emergency department.  She has subsequently felt well.  She has a normal EKG and her troponin levels have been minimally elevated but rising to the most recent of 0.093.  She has not had any recurrent chest discomfort.    She denies a family history of coronary disease, diabetes mellitus or tobacco use.  She is slender and does not typically have trouble sleeping.  She has not had chest discomfort previously.  She has not had prior episodes of light-headedness or fainting.  She has had prior vertiginous episodes.              Past Medical History:     Past Medical History:   Diagnosis Date     Anti-phospholipid antibody syndrome (H)      Contraception     h/o iud     Hypertension      Lupus (systemic lupus erythematosus) (H) 2005     Routine general medical examination at a health care facility     vivienne--primary MD/[facility]             Past Surgical History:     Past Surgical History:   Procedure Laterality Date     APPENDECTOMY       COLPOSCOPY CERVIX, BIOPSY CERVIX, ENDOCERVICAL CURETTAGE, COMBINED      polyp-benign     LEEP TX, CERVICAL       lipoma back  2013     septoplasty       TONSILLECTOMY                 Social History:     Social History     Tobacco Use     Smoking status: Former Smoker      "Smokeless tobacco: Never Used     Tobacco comment: occ hooka   Substance Use Topics     Alcohol use: Yes             Family History:     Family History   Problem Relation Age of Onset     Gynecology Mother      Psychotic Disorder Mother         depression     Diabetes Maternal Grandmother      C.A.D. Maternal Grandmother      Psychotic Disorder Maternal Grandmother              Allergies:     Allergies   Allergen Reactions     Bee Venom Swelling     Bees      Blood-Group Specific Substance Other (See Comments)     Patient has a Non-Specific Antibody.  Blood products may be delayed.     Estrogens      Nitrofurantoin Nausea and Vomiting     Wasp Venom Protein      Other reaction(s): Edema  large localized reaction             Medications:     Medications Prior to Admission   Medication Sig Dispense Refill Last Dose     amLODIPine (NORVASC) 10 MG tablet Take 10 mg by mouth daily    1/6/2020 at am     aspirin 81 MG EC tablet Take 81 mg by mouth daily    1/3/2020 at am     medroxyPROGESTERone (DEPO-PROVERA) 150 MG/ML IM injection Inject 150 mg into the muscle every 3 months    due 3rd week of Jan     multivitamin w/minerals (THERA-VIT-M) tablet Take 1 tablet by mouth daily chewable   1/6/2020 at am     omeprazole (PRILOSEC) 20 MG DR capsule Take 20 mg by mouth daily   2 weeks ago     polyethylene glycol (MIRALAX/GLYCOLAX) packet Take 1 packet by mouth daily as needed for constipation   Past Week             Review of Systems:   The 10 point Review of Systems is negative other than noted in the HPI            Physical Exam:   Vitals were reviewed  Blood pressure 122/86, pulse 90, temperature 98.6  F (37  C), temperature source Oral, resp. rate 18, height 1.575 m (5' 2\"), weight 49.9 kg (110 lb), SpO2 97 %.  110 lbs 0 oz    Constitutional: awake, alert, cooperative, no apparent distress, and appears stated age  Eyes:  sclera clear, conjunctiva normal    Musculoskeletal: no lower extremity pitting edema present  Neurologic: " Mental Status Exam:  Orientation:   person, place, time  Motor Exam:  moves all extremities well and symmetrically  Skin: normal skin color, texture, turgor and no jaundice         Data:        Lab Results   Component Value Date     01/06/2020    Lab Results   Component Value Date    CHLORIDE 113 01/06/2020    Lab Results   Component Value Date    BUN 16 01/06/2020      Lab Results   Component Value Date    POTASSIUM 3.4 01/06/2020    Lab Results   Component Value Date    CO2 25 01/06/2020    Lab Results   Component Value Date    CR 1.01 01/06/2020        Lab Results   Component Value Date    WBC 3.9 (L) 01/06/2020    HGB 13.0 01/06/2020    HCT 37.9 01/06/2020    MCV 88 01/06/2020     01/06/2020     Lab Results   Component Value Date    INR 1.03 01/10/2008     Lab Results   Component Value Date    TSH 2.09 01/10/2008     Lab Results   Component Value Date    TROPI 0.093 (H) 01/06/2020     Lab Results   Component Value Date    TROPI 0.093 (H) 01/06/2020    TROPI 0.074 (H) 01/06/2020    TROPI 0.061 (H) 01/06/2020     EKG results:   I have reviewed this patient's EKG with the following interpretation:         Normal sinus rhythm, normal axis, no acute ischemic ST segment or T wave changes

## 2020-01-07 NOTE — PROGRESS NOTES
Observation Goals:  - Serial troponins and stress test complete: not met  - Seen and cleared by consultant if applicable: not met  - Adequate pain control on oral analgesia: met   - Vital signs normal or at patient baseline: met   - Safe disposition plan has been identified: not met

## 2020-01-07 NOTE — PLAN OF CARE
Observation goals PRIOR TO DISCHARGE     Comments: List all goals to be met before discharge home:   - Serial troponins and stress test complete: Partially met, plans for heart cath in the am.   - Seen and cleared by consultant if applicable: Not met  - Adequate pain control on oral analgesia: Met  - Vital signs normal or at patient baseline: Met  - Safe disposition plan has been identified: Not met  - Nurse to notify provider when observation goals have been met and patient is ready for discharge.     Pt A&O, VSS. On RA. Denies any CP. Tele NSR. trops completed. Had CT angio and echo today, plans for heart cath in the am. Tolerating reg diet, NPO at midnight. IV SL. Ind in room. Will cont to monitor.

## 2020-01-07 NOTE — PLAN OF CARE
Pt A&O. VSS. On RA. Denies any currently CP or SOB. No lightheadedness or dizziness. CMS intact. Tele NSR. Trops 0.061, 0.074, 0.093. MD aware. Plans for cards consult and stress echo in the am. Tolerating reg diet. Voiding. Up ind. Will cont to monitor.

## 2020-01-07 NOTE — PROGRESS NOTES
Pt a&o, up indep in room, flat affect.  Pt down for CT angio, awaiting results but per verbal from PA, pt ok to eat after ct, nurse already place order for pt.  Pt denies pain, scheduled bp and prn bp meds given.  Pt possible discharge in afternoon with event monitor.  Continue to monitor and POC

## 2020-01-07 NOTE — PLAN OF CARE
A&Ox4, flat affect. VSS on room air. Tele NSR. No chest pain. Denies nausea. NPO since 0000. C/o dry mouth but declining Biotene spray and lozenges. L IV S/L. Up independently. Trops have been mildly elevated. Plan for CT angio of coronary artery today. Encouraged to call with questions/needs/concerns. Continue to monitor.

## 2020-01-07 NOTE — PLAN OF CARE
PRIMARY DIAGNOSIS: CHEST PAIN  OUTPATIENT/OBSERVATION GOALS TO BE MET BEFORE DISCHARGE:  1. Ruled out acute coronary syndrome (negative or stable Troponin):  No  2. Pain Status: Pain free.  3. Appropriate provocative testing performed: No  - Stress Test Procedure: CT Angio  - Interpretation of cardiac rhythm per telemetry tech: SR    4. Cleared by Consultants (if applicable):No  5. Return to near baseline physical activity: Yes  Discharge Planner Nurse   Safe discharge environment identified: No  Barriers to discharge: Yes       Entered by: Amparo Park 01/07/2020 1:46 PM     Please review provider order for any additional goals.   Nurse to notify provider when observation goals have been met and patient is ready for discharge.

## 2020-01-07 NOTE — PROGRESS NOTES
[facility]  Cardiology Progress Note  Date of Service: 01/07/2020    Assessment & Plan    Thao Combs is a 34 year old female with past medical history significant for lupus, hypertension, anti-phospholipid antibody syndrome was admitted on 1/6/2020 with chest pain.     Assessment and Plan:   1. Likely vasovagal episode    No recurrent vertigo or presyncope symptoms     Tele continues to show SR without arrhythmia, bradycardia or significant pauses.     D-dimer elevated to 0.6 however, CT chest negative for PE    2. Trivial toponin rise likely related to transient hypotension in the setting of a vagal episode    Troponin peak 0.1     EKG normal sinus rhythm    Echocardiogram showed a normal LV systolic function with EF 55-60%, normal RV, no valvular disease    2 sL nitroglycerin given in route to ED, resolved pain    Aspirin 81 mg    She remains chest pain free with no further episode     3. Hypertension    Well-controlled, 108/71 (previously elevated and amlodipine was increased to 10 mg)    Amlodipine 10 mg dialy     Plan:  1. CT coronary angiogram today. Should she have significant calcification, we will consider a coronary angiogram at that time.   2. Fast lipid profile add on to am labs  3. Would recommend she discharge with a 30 day event monitor to rule out arrhythmia, bradycardia or significant pauses as the cause of her vasovagal episodes  4. Recommend compression stockings, maintaining hydration   5. Will await CTa results for further recommendations     CLYDE Gonzales CNP  Pager:  [phone]   Text Page  (7am - 4pm, M-F)      Interval History   No acute events overnight. She denies recurrent chest pain and dizziness    Physical Exam   Temp: 98  F (36.7  C) Temp src: Oral BP: 108/71 Pulse: 95 Heart Rate: 65 Resp: 16 SpO2: 98 % O2 Device: None (Room air)    Vitals:    01/06/20 0603   Weight: 49.9 kg (110 lb)       Constitutional:   NAD   Skin:   Warm and dry   Head:    Nontraumatic   Neck:   supple, symmetrical, trachea midline   Lungs:   symmetric, clear to auscultation   Cardiovascular:   regular rate and rhythm, normal S1 and S2 and no murmur noted   Abdomen:   Benign   Extremities and Back:   No edema   Neurological:   Grossly nonfocal       Medications       sodium chloride 0.9%  100 mL Intravenous Once     amLODIPine  10 mg Oral Daily     aspirin  81 mg Oral Daily     iopamidol  500 mL Intravenous Once     sodium chloride (PF)  10 mL Intravenous Once     sodium chloride (PF)  10 mL Intravenous Once     sodium chloride (PF)  3 mL Intracatheter Q8H       Data     Most Recent 3 CBC's:  Recent Labs   Lab Test 01/06/20  0629 11/17/14  1211   WBC 3.9*  --    HGB 13.0 13.6   MCV 88  --      --      Most Recent 3 BMP's:  Recent Labs   Lab Test 01/07/20  0244 01/06/20  0629    141   POTASSIUM 4.2 3.4   CHLORIDE 110* 113*   CO2 21 25   BUN 16 16   CR 0.97 1.01   ANIONGAP  --  3   TONO 8.7 8.9   GLC 91 96     Most Recent 3 Troponin's:  Recent Labs   Lab Test 01/07/20  0244 01/06/20  1428 01/06/20  1126   TROPI 0.100* 0.093* 0.074*     Most Recent D-dimer:  Recent Labs   Lab Test 01/06/20  0629   DD 0.6*

## 2020-01-08 ENCOUNTER — SURGERY (OUTPATIENT)
Age: 35
End: 2020-01-08
Payer: COMMERCIAL

## 2020-01-08 VITALS
DIASTOLIC BLOOD PRESSURE: 69 MMHG | OXYGEN SATURATION: 100 % | HEIGHT: 62 IN | HEART RATE: 79 BPM | BODY MASS INDEX: 20.24 KG/M2 | SYSTOLIC BLOOD PRESSURE: 90 MMHG | RESPIRATION RATE: 16 BRPM | TEMPERATURE: 99.6 F | WEIGHT: 110 LBS

## 2020-01-08 PROBLEM — R79.89 ELEVATED TROPONIN: Status: ACTIVE | Noted: 2020-01-06

## 2020-01-08 PROBLEM — R07.9 CHEST PAIN, UNSPECIFIED TYPE: Status: ACTIVE | Noted: 2020-01-06

## 2020-01-08 LAB — CATH EF ESTIMATED: 60 %

## 2020-01-08 PROCEDURE — 40000235 ZZH STATISTIC TELEMETRY

## 2020-01-08 PROCEDURE — 25000132 ZZH RX MED GY IP 250 OP 250 PS 637: Performed by: NURSE PRACTITIONER

## 2020-01-08 PROCEDURE — 25000125 ZZHC RX 250: Performed by: INTERNAL MEDICINE

## 2020-01-08 PROCEDURE — 93458 L HRT ARTERY/VENTRICLE ANGIO: CPT | Performed by: INTERNAL MEDICINE

## 2020-01-08 PROCEDURE — 27210794 ZZH OR GENERAL SUPPLY STERILE: Performed by: INTERNAL MEDICINE

## 2020-01-08 PROCEDURE — 99152 MOD SED SAME PHYS/QHP 5/>YRS: CPT | Performed by: INTERNAL MEDICINE

## 2020-01-08 PROCEDURE — C1894 INTRO/SHEATH, NON-LASER: HCPCS | Performed by: INTERNAL MEDICINE

## 2020-01-08 PROCEDURE — 93458 L HRT ARTERY/VENTRICLE ANGIO: CPT | Mod: 26 | Performed by: INTERNAL MEDICINE

## 2020-01-08 PROCEDURE — 99217 ZZC OBSERVATION CARE DISCHARGE: CPT | Performed by: PHYSICIAN ASSISTANT

## 2020-01-08 PROCEDURE — G0378 HOSPITAL OBSERVATION PER HR: HCPCS

## 2020-01-08 PROCEDURE — 25000128 H RX IP 250 OP 636: Performed by: INTERNAL MEDICINE

## 2020-01-08 PROCEDURE — C1769 GUIDE WIRE: HCPCS | Performed by: INTERNAL MEDICINE

## 2020-01-08 PROCEDURE — 40000852 ZZH STATISTIC HEART CATH LAB OR EP LAB

## 2020-01-08 PROCEDURE — 99214 OFFICE O/P EST MOD 30 MIN: CPT | Mod: 25 | Performed by: NURSE PRACTITIONER

## 2020-01-08 RX ORDER — FENTANYL CITRATE 50 UG/ML
INJECTION, SOLUTION INTRAMUSCULAR; INTRAVENOUS
Status: DISCONTINUED | OUTPATIENT
Start: 2020-01-08 | End: 2020-01-08 | Stop reason: HOSPADM

## 2020-01-08 RX ORDER — NITROGLYCERIN 5 MG/ML
VIAL (ML) INTRAVENOUS
Status: DISCONTINUED | OUTPATIENT
Start: 2020-01-08 | End: 2020-01-08 | Stop reason: HOSPADM

## 2020-01-08 RX ORDER — HEPARIN SODIUM 1000 [USP'U]/ML
INJECTION, SOLUTION INTRAVENOUS; SUBCUTANEOUS
Status: DISCONTINUED | OUTPATIENT
Start: 2020-01-08 | End: 2020-01-08 | Stop reason: HOSPADM

## 2020-01-08 RX ORDER — LORAZEPAM 0.5 MG/1
0.5 TABLET ORAL
Status: DISCONTINUED | OUTPATIENT
Start: 2020-01-08 | End: 2020-01-08 | Stop reason: HOSPADM

## 2020-01-08 RX ORDER — LORAZEPAM 2 MG/ML
0.5 INJECTION INTRAMUSCULAR
Status: DISCONTINUED | OUTPATIENT
Start: 2020-01-08 | End: 2020-01-08 | Stop reason: HOSPADM

## 2020-01-08 RX ORDER — NALOXONE HYDROCHLORIDE 0.4 MG/ML
.1-.4 INJECTION, SOLUTION INTRAMUSCULAR; INTRAVENOUS; SUBCUTANEOUS
Status: DISCONTINUED | OUTPATIENT
Start: 2020-01-08 | End: 2020-01-08

## 2020-01-08 RX ORDER — FLUMAZENIL 0.1 MG/ML
0.2 INJECTION, SOLUTION INTRAVENOUS
Status: DISCONTINUED | OUTPATIENT
Start: 2020-01-08 | End: 2020-01-08 | Stop reason: HOSPADM

## 2020-01-08 RX ORDER — SODIUM CHLORIDE 9 MG/ML
INJECTION, SOLUTION INTRAVENOUS CONTINUOUS
Status: DISCONTINUED | OUTPATIENT
Start: 2020-01-08 | End: 2020-01-08 | Stop reason: HOSPADM

## 2020-01-08 RX ORDER — FENTANYL CITRATE 50 UG/ML
25-50 INJECTION, SOLUTION INTRAMUSCULAR; INTRAVENOUS
Status: DISCONTINUED | OUTPATIENT
Start: 2020-01-08 | End: 2020-01-08 | Stop reason: HOSPADM

## 2020-01-08 RX ORDER — POTASSIUM CHLORIDE 1500 MG/1
20 TABLET, EXTENDED RELEASE ORAL
Status: DISCONTINUED | OUTPATIENT
Start: 2020-01-08 | End: 2020-01-08 | Stop reason: HOSPADM

## 2020-01-08 RX ORDER — NALOXONE HYDROCHLORIDE 0.4 MG/ML
.2-.4 INJECTION, SOLUTION INTRAMUSCULAR; INTRAVENOUS; SUBCUTANEOUS
Status: DISCONTINUED | OUTPATIENT
Start: 2020-01-08 | End: 2020-01-08

## 2020-01-08 RX ORDER — ATROPINE SULFATE 0.1 MG/ML
0.5 INJECTION INTRAVENOUS EVERY 5 MIN PRN
Status: DISCONTINUED | OUTPATIENT
Start: 2020-01-08 | End: 2020-01-08 | Stop reason: HOSPADM

## 2020-01-08 RX ORDER — LIDOCAINE 40 MG/G
CREAM TOPICAL
Status: DISCONTINUED | OUTPATIENT
Start: 2020-01-08 | End: 2020-01-08 | Stop reason: HOSPADM

## 2020-01-08 RX ORDER — ACETAMINOPHEN 325 MG/1
650 TABLET ORAL EVERY 4 HOURS PRN
Status: DISCONTINUED | OUTPATIENT
Start: 2020-01-08 | End: 2020-01-08 | Stop reason: HOSPADM

## 2020-01-08 RX ORDER — IOPAMIDOL 755 MG/ML
INJECTION, SOLUTION INTRAVASCULAR
Status: DISCONTINUED | OUTPATIENT
Start: 2020-01-08 | End: 2020-01-08 | Stop reason: HOSPADM

## 2020-01-08 RX ORDER — VERAPAMIL HYDROCHLORIDE 2.5 MG/ML
INJECTION, SOLUTION INTRAVENOUS
Status: DISCONTINUED | OUTPATIENT
Start: 2020-01-08 | End: 2020-01-08 | Stop reason: HOSPADM

## 2020-01-08 RX ADMIN — ASPIRIN 81 MG: 81 TABLET, DELAYED RELEASE ORAL at 08:42

## 2020-01-08 RX ADMIN — NITROGLYCERIN 200 MCG: 5 INJECTION, SOLUTION INTRAVENOUS at 10:39

## 2020-01-08 RX ADMIN — HEPARIN SODIUM 5000 UNITS: 1000 INJECTION, SOLUTION INTRAVENOUS; SUBCUTANEOUS at 10:45

## 2020-01-08 RX ADMIN — MIDAZOLAM 1 MG: 1 INJECTION INTRAMUSCULAR; INTRAVENOUS at 10:20

## 2020-01-08 RX ADMIN — AMLODIPINE BESYLATE 10 MG: 5 TABLET ORAL at 08:42

## 2020-01-08 RX ADMIN — LIDOCAINE HYDROCHLORIDE 2 ML: 10 INJECTION, SOLUTION EPIDURAL; INFILTRATION; INTRACAUDAL; PERINEURAL at 10:38

## 2020-01-08 RX ADMIN — MIDAZOLAM 1 MG: 1 INJECTION INTRAMUSCULAR; INTRAVENOUS at 10:37

## 2020-01-08 RX ADMIN — IOPAMIDOL 80 ML: 755 INJECTION, SOLUTION INTRAVENOUS at 10:57

## 2020-01-08 RX ADMIN — VERAPAMIL HYDROCHLORIDE 2.5 MG: 2.5 INJECTION, SOLUTION INTRAVENOUS at 10:39

## 2020-01-08 RX ADMIN — FENTANYL CITRATE 50 MCG: 50 INJECTION, SOLUTION INTRAMUSCULAR; INTRAVENOUS at 10:19

## 2020-01-08 NOTE — PLAN OF CARE
PRIMARY DIAGNOSIS: CHEST PAIN  OUTPATIENT/OBSERVATION GOALS TO BE MET BEFORE DISCHARGE:  1. Ruled out acute coronary syndrome (negative or stable Troponin):  Yes  2. Pain Status: Pain free.  3. Appropriate provocative testing performed: No  - Stress Test Procedure: PCI  - Interpretation of cardiac rhythm per telemetry tech: SR    4. Cleared by Consultants (if applicable):No  5. Return to near baseline physical activity: Yes  Discharge Planner Nurse   Safe discharge environment identified: No  Barriers to discharge: Yes       Entered by: Amparo Park 01/08/2020 9:53 AM     Please review provider order for any additional goals.   Nurse to notify provider when observation goals have been met and patient is ready for discharge.

## 2020-01-08 NOTE — DISCHARGE INSTRUCTIONS
Cardiac Angiogram Discharge Instructions - Radial    After you go home:      Have an adult stay with you until tomorrow.    Drink extra fluids for 2 days.    You may resume your normal diet.    No smoking       For 24 hours - due to the sedation you received:    Relax and take it easy.    Do NOT make any important or legal decisions.    Do NOT drive or operate machines at home or at work.    Do NOT drink alcohol.    Care of Wrist Puncture Site:      For the first 24 hrs - check the puncture site every 1-2 hours while awake.    It is normal to have soreness at the puncture site and mild tingling in your hand for up to 3 days.    Remove the bandaid after 24 hours. If there is minor oozing, apply another bandaid and remove it after 12 hours.    You may shower tomorrow.  Do NOT take a bath, or use a hot tub or pool for at least 3 days. Do NOT scrub the site. Do not use lotion or powder near the puncture site.           Activity:        For 2 days:     do not use your hand or arm to support your weight (such as rising from a chair)     do not bend your wrist (such as lifting a garage door).    do not lift more than 5 pounds or exercise your arm (such as tennis, golf or bowling).    Do NOT do any heavy activity such as exercise, lifting, or straining.     Bleeding:      If you start bleeding from the site in your wrist, sit down and press firmly on/above the site for 10 minutes.     Once bleeding stops, keep arm still for 2 hours.     Call [facility] as soon as you can.       Call 911 right away if you have heavy bleeding or bleeding that does not stop.      Medicines:      If you are taking an antiplatelet medication such as Plavix, Brilinta or Effient, do not stop taking it until you talk to your cardiologist.     Take your medications, including blood thinners, unless your provider tells you not to.     If you have stopped any medicines, check with your provider about when to restart them.    Follow Up  Appointments:      Follow up with [facility] Nurse Practitioner at [facility] of patient preference in 7-10 days.    Call the clinic if:      You have a large or growing hard lump around the site.    The site is red, swollen, hot or tender.    Blood or fluid is draining from the site.    You have chills or a fever greater than 101 F (38 C).    Your arm feels numb, cool or changes color.    You have hives, a rash or unusual itching.    Any questions or concerns.          [facility] [place]:    [phone] [facility] (7 days a week)

## 2020-01-08 NOTE — PLAN OF CARE
Pt a&o, up indep, denies pain, iv fluids started per protocol.  Pt down to PCI for intervention. Report handoff given.    Pt report received that nurse will not return to obs unit and discharge from receovery.  Nurse sign off.

## 2020-01-08 NOTE — PRE-PROCEDURE
GENERAL PRE-PROCEDURE:   Procedure:  Coronary angiogram, left heart catheterization, left ventriculogram, possible intervention.  Date/Time:  1/8/2020 10:32 AM    Written consent obtained?: Yes    Risks and benefits: Risks, benefits and alternatives were discussed    Consent given by:  Patient  Patient states understanding of procedure being performed: Yes    Patient's understanding of procedure matches consent: Yes    Procedure consent matches procedure scheduled: Yes    Expected level of sedation:  Moderate  Appropriately NPO:  Yes  ASA Class:  Class 2- mild systemic disease, no acute problems, no functional limitations  Mallampati  :  Grade 1- soft palate, uvula, tonsillar pillars, and posterior pharyngeal wall visible  Lungs:  Lungs clear with good breath sounds bilaterally  Heart:  Normal heart sounds and rate  History & Physical reviewed:  History and physical reviewed and no updates needed  Statement of review:  I have reviewed the lab findings, diagnostic data, medications, and the plan for sedation

## 2020-01-08 NOTE — PROGRESS NOTES
[facility]    Cardiology Progress Note    Date of Service: 01/08/2020    Summary:  Thao Combs is a 34 year old female with past medical history significant for lupus, hypertension, anti-phospholipid antibody syndrome was admitted to [facility] on 1/6/2020 with chest pain.     Assessment & Plan     1. Likely vasovagal episode    No recurrent vertigo or presyncope symptoms     Tele continues to show SR without arrhythmia, bradycardia or significant pauses.     D-dimer elevated to 0.6 however, CT chest negative for PE    2. Trivial toponin rise likely related to transient hypotension in the setting of a vagal episode    Troponin peak 0.1     EKG normal sinus rhythm    Echocardiogram showed a normal LV systolic function with EF 55-60%, normal RV, no valvular disease    2 sublingual nitroglycerin given in route to ED, resolved pain    Started on aspirin 81 mg    She remains chest pain free with no further episodes     CT coronary angiogram yesterday was of suboptimal quality and therefore significant stenosis could not be ruled out, particularly in the mid to apical LAD. Dr. Francis reviewed this and a coronary angiogram was recommended.      3. Hypertension    Well-controlled on increased dose of amlodipine at 10 mg once daily.    Plan:   1. Coronary angiogram today. An informed consent form has been signed and the plan was reviewed this morning.  2. Possible discharge home this afternoon pending the coronary angiogram findings.  3. Could consider an event monitor at discharge to rule out arrhythmia or significant pauses as the cause of her episode, although she has remained in normal sinus rhythm to sinus bradycardia during hours of sleep on telemetry.    Johnson Handy NP  Pager:  [phone]   Text Page  (7am - 4pm, M-F)    Interval History   No acute events overnight. She denies recurrent chest pain or dizziness. She has not had palpitations or shortness of  breath.    Telemetry: NSR to sinus bradycardia overnight with HR in the mid 40s to 70s.     Physical Exam   Temp: 99.6  F (37.6  C) Temp src: Oral BP: 119/83   Heart Rate: 68 Resp: 16 SpO2: 96 % O2 Device: None (Room air)    Vitals:    01/06/20 0603   Weight: 49.9 kg (110 lb)     Constitutional:   NAD   Skin:   Warm and dry   Head:   Normocephalic, atraumatic.   Neck:   Supple, symmetrical, trachea midline   Lungs:   Breathing non-labored, lungs clear to auscultation bilaterally with no wheezing or crackles.   Cardiovascular:   Regular rate and rhythm, normal S1 and S2, no murmur, rub, or gallop. Radial pulses 2+ bilaterally.   Extremities and Back:   No clubbing, cyanosis, or edema   Neurological:   No gross focal deficits. Responds to questions appropriately.      Medications       amLODIPine  10 mg Oral Daily     aspirin  81 mg Oral Daily     sodium chloride (PF)  10 mL Intravenous Once     sodium chloride (PF)  10 mL Intravenous Once     sodium chloride (PF)  3 mL Intracatheter Q8H       Data     Most Recent 3 CBC's:  Recent Labs   Lab Test 01/06/20  0629 11/17/14  1211   WBC 3.9*  --    HGB 13.0 13.6   MCV 88  --      --      Most Recent 3 BMP's:  Recent Labs   Lab Test 01/07/20  0244 01/06/20  0629    141   POTASSIUM 4.2 3.4   CHLORIDE 110* 113*   CO2 21 25   BUN 16 16   CR 0.97 1.01   ANIONGAP  --  3   TONO 8.7 8.9   GLC 91 96     Most Recent 3 Troponin's:  Recent Labs   Lab Test 01/07/20  0244 01/06/20  1428 01/06/20  1126   TROPI 0.100* 0.093* 0.074*     Most Recent D-dimer:  Recent Labs   Lab Test 01/06/20  0629   DD 0.6*

## 2020-01-08 NOTE — PROGRESS NOTES
[facility] Post-Procedure Note    Procedure: Heart cath  CS arrival time: 1105  Accompanied by: Cath lab staff  Concerns/abnormal assessment after procedure:   Pt returned from cath lab with TR band in place, air removed per protocol til empty. Removed and cleaned site, band aid applied. No hematoma, no bruising, no pain at site. Pt tolerating regular diet. Ambulating, voiding without issue.  Plan: recovery from sedation, TR band removed, PA here to discharge pt to home.    [facility] Discharge Nursing Note    Education/questions answered: Dr Palomino here to discuss with pt regarding findings. Pt will follow up in clinic and with her other doctors  Patient DC location: Home   Accompanied by: Mom  CS discharge time: 1540

## 2020-01-08 NOTE — PROGRESS NOTES
[facility] Post-Procedure Note    Procedure: Left heart cath  CS arrival time: 11:05  Accompanied by: Sharon Cath Lab RN  Concerns/abnormal assessment after procedure: None  Plan: Continue to monitor.  Remove air from TR band per protocol.  Transfer back to Observation or discharge to home.

## 2020-01-08 NOTE — PROGRESS NOTES
[facility] Admission Nursing Note    Reason for admission: Heart cath. Admitted to OBS Monday with chest pain and elevated troponin  CS arrival time: Arrived to [facility] this morning prior to heart cath  Accompanied by: Flying squad RN and LAURA  Name/phone of DC : Mom Breana will come later.  Medications held: none  Consent signed: yes  Abnormal assessment/labs: Elevated troponins  If abnormal, provider notified: Team aware  Education/questions answered: Yes  Plan: to cath lab via cart

## 2020-01-08 NOTE — PROGRESS NOTES
Observation goals PRIOR TO DISCHARGE     List all goals to be met before discharge home:   - Serial troponins and stress test complete: Partially met, heart cath in morning.  - Seen and cleared by consultant if applicable: Not met  - Adequate pain control on oral analgesia: Met  - Vital signs normal or at patient baseline: Met  - Safe disposition plan has been identified: Not met  - Nurse to notify provider when observation goals have been met and patient is ready for discharge.

## 2020-01-08 NOTE — PLAN OF CARE
Up IND. A/ox4, flat affect. VSS RA. Denies pain, SOB, nausea or HA. Tele: SB. Diet NPO since MN. Plan for heart cath this morning.

## 2020-01-09 ENCOUNTER — TELEPHONE (OUTPATIENT)
Dept: CARDIOLOGY | Facility: CLINIC | Age: 35
End: 2020-01-09

## 2020-01-09 LAB — INTERPRETATION ECG - MUSE: NORMAL

## 2020-01-09 NOTE — DISCHARGE SUMMARY
"[facility]    Discharge Summary  Hospitalist    Date of Admission:  1/6/2020  Date of Discharge:  1/8/2020  3:40 PM  Discharging Provider: Marcin Chen  Date of Service (when I saw the patient): 1/8/20    Discharge Diagnoses   1. Likely vasovagal episode  2. Trivial troponin rise likely related to transient hypotension in the setting of a vagal episode  3. Hypertension  4. CKD stage II  5. Proteinuria  6. Autoimmune process  7. Positive lupus anticoagulant  8. Questionable antiphospholipid syndrome    History of Present Illness   Thao Combs is a 34 year old female with a past medical history of hypertension, CKD stage II, and question of antiphospholipid syndrome admitted on 1/6/2020 after an episode of dizziness, lightheadedness, and chest pain with elevation in troponin on ED evaluation. Registered to the observation unit for further evaluation and treatment. See formal note by Kylee Gannon CNP, on 01/06/20 for complete details on presentation.     Hospital Course   Dizziness, lightheadedness, likely vagal episode   Atypical chest pain, transient  Elevated troponin: Arrived to work on day of admission, developed vertiginous symptoms \"room spinning\" and some associated lightheadedness with positional change. In the midst of this, reported a sudden onset stabbing chest pain which was localized to midsternal area which slightly radiated to the right side of the sternum.  Patient reports she tried breathing exercises to alleviate symptoms when she became hot, diaphoretic, developed tinnitus, and became dizzy at which point, the patient reports she started to panic and felt as if her heart were racing and she became short of breath.  Of note, the patient reports she was lifting weights on Saturday. She completed a chest routine which is unusual for the patient. The pain is not reproducible on examination.  EMS was called and on EMS arrival she was provided with aspirin 324 mg p.o. and " nitroglycerin spray with relief in symptoms. Troponin 0.061>0.074>0.093>0.100. D-dimer 0.6. CT chest for PE protocol negative. EKG reviewed, incomplete RBBB. CMP, CBC unremarkable. Lipid profile 129/73/48/41. TSH and magnesium WNL. Echocardiogram with EF 55-60%, no significant valvular abnormalities.   --No recurrence of symptoms since admission   --Telemetry with NSR   --Cardiology consulted for elevated troponin, see initial consult by Dr. Palomino.   --CT coronary angiogram performed 1/7/2020, suboptimal quality and therefore significant stenosis could not be ruled out, particularly in the mid to apical LAD. Dr. Francis reviewed this and a coronary angiogram was recommended.    --Coronary angiogram 1/8/2020 reveals only mild disease.  35% mid to distal LAD stenosis.  --Discussed with Dr. Palomino, no further work-up recommended at this time.  Cardiology clinic will arrange follow-up with the patient.    Hypertension: Continue PTA Amlodipine 10mg PO daily     CKD, stage II  Proteinuria: Pt had kidney biopsy 12/24/2019; biopsy results pending. Patient has an outpatient appt with her nephrologist on 1/8/2020. Patient reports preliminary results were called to her and negative. Renal function at baseline Creatinine 1.01; GFR 73  - Avoid NSAIDS     Autoimmune process  Positive Lupus anticoagulant  Questionable Anti-phospholipid syndrome: Patient diagnosed with anti-phospholipid syndrome at 19. Patient follows Dr. Russell of Rheumatology. Per most recent outpatient visit on 10/31/2019, he does not believe that she has anti-phospholipid syndrome. LORENA negative, normal C3, C4, negative anti-phospholipid antibiody. Positive lupus anticoagulant.  She did have a stillbirth at 24 weeks gestation in 2012. Currently, using depo injection for birth control.  - Continue PTA ASA 81mg PO daily      Marcin Chen PA-C    I personally saw the patient on day of discharge to evaluate her, discuss test results, and review plan of  care.    Significant Results and Procedures   --Coronary angiogram 1/8/2020:  1.  Only mild disease.  A smooth 35% mid to distal LAD stenosis.  The lady does wraparound the apex of the heart.  2.  Left ventricular end-diastolic pressure of 17 mmHg.  Normal left ventricular function.  No mitral regurgitation or aortic stenosis.  3.  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    --CT coronary angiogram  Overall technically challenging suboptimal study due to poor  opacification and motion artifacts. The left main, proximal to mid  LAD, proximal circumflex, proximal to distal RCA vessels overall look  patent without detectable stenosis. However mid to apical LAD is a  smaller caliber vessel, poorly opacified with motion artifact and  stenosis evaluation is suboptimal in this segment. After distal RCA no  PDA or AXEL vessels are visualized. The mid segment of obtuse marginal  1 and second diagonal vessels have motion artifacts and stenosis  evaluation is suboptimal in these segments.    Pending Results   None    Code Status   Full Code       Primary Care Physician   [facility]    Physical Exam       BP: 90/69 Pulse: 79 Heart Rate: 67 Resp: 16 SpO2: 100 % O2 Device: None (Room air) Oxygen Delivery: 2 LPM  Vitals:    01/06/20 0603   Weight: 49.9 kg (110 lb)     Vital Signs with Ranges  Pulse:  [54-79] 79  Heart Rate:  [67] 67  Resp:  [16] 16  BP: ()/(69-84) 90/69  SpO2:  [98 %-100 %] 100 %  I/O last 3 completed shifts:  In: 360 [P.O.:360]  Out: -      CONSTITUTIONAL: Pt laying in bed, dressed in hospital garb. Appears comfortable. Cooperative with interview.  HEENT: Normocephalic, atraumatic. Negative for conjunctival redness or scleral icterus.   CARDIOVASCULAR: Well perfused.  RESPIRATORY: No increased work of breathing.  GASTROINTESTINAL:  Abdomen soft, non-distended.  MUSCULOSKELETAL: No gross deformities noted. Normal muscle tone.    HEMATOLOGIC/LYMPHATIC/IMMUNOLOGIC: Negative for lower extremity edema, bilaterally.  NEUROLOGIC: Alert and oriented to person, place, and time.   SKIN: Warm, dry, intact. No jaundice noted. Negative for suspicious lesions, rashes, bruising, open sores or abrasions.       Discharge Disposition   Discharged to home  Condition at discharge: Stable    Consultations This Hospital Stay   CARDIOLOGY IP CONSULT  PHARMACY IP CONSULT  PHARMACY IP CONSULT  SMOKING CESSATION PROGRAM IP CONSULT       Discharge Orders      Activity    Your activity upon discharge: activity as tolerated     Discharge Instructions    1) No medication changes at discharge.     Follow-up and recommended labs and tests     Follow up with primary care provider, [facility], within 7 days for hospital follow-up.  No follow up labs or test are needed.      Follow up with Cardiology as recommended.  Dr. Palomino's clinic will call you to arrange a follow up appointment.     Reason for your hospital stay    You were hospitalized for further evaluation of dizziness, lightheadedness and chest pain. Found to have a marginally elevated troponin level (cardiac enzyme). You underwent further cardiac testing with echocardiogram which was unremarkable. You likely had a vasovagal episode. Cardiology will follow up with you regarding any additional outpatient testing or therapies.     Full Code     Diet    Follow this diet upon discharge: Regular diet     Discharge Medications   Discharge Medication List as of 1/8/2020  3:05 PM      CONTINUE these medications which have NOT CHANGED    Details   amLODIPine (NORVASC) 10 MG tablet Take 10 mg by mouth daily , Historical      aspirin 81 MG EC tablet Take 81 mg by mouth daily , Historical      medroxyPROGESTERone (DEPO-PROVERA) 150 MG/ML IM injection Inject 150 mg into the muscle every 3 months , Historical      multivitamin w/minerals (THERA-VIT-M) tablet Take 1 tablet by mouth daily chewable, Historical       omeprazole (PRILOSEC) 20 MG DR capsule Take 20 mg by mouth daily, Historical      polyethylene glycol (MIRALAX/GLYCOLAX) packet Take 1 packet by mouth daily as needed for constipation, Historical           Allergies   Allergies   Allergen Reactions     Bee Venom Swelling     Bees      Blood-Group Specific Substance Other (See Comments)     Patient has a Non-Specific Antibody.  Blood products may be delayed.     Estrogens      Nitrofurantoin Nausea and Vomiting     Propranolol Hives     Wasp Venom Protein      Other reaction(s): Edema  large localized reaction     Data   Most Recent 3 CBC's:  Recent Labs   Lab Test 01/06/20  0629 11/17/14  1211   WBC 3.9*  --    HGB 13.0 13.6   MCV 88  --      --       Most Recent 3 BMP's:  Recent Labs   Lab Test 01/07/20  0244 01/06/20  0629    141   POTASSIUM 4.2 3.4   CHLORIDE 110* 113*   CO2 21 25   BUN 16 16   CR 0.97 1.01   ANIONGAP  --  3   TONO 8.7 8.9   GLC 91 96     Most Recent 2 LFT's:  Recent Labs   Lab Test 01/06/20  0629   AST 23   ALT 24   ALKPHOS 53   BILITOTAL 0.7     Most Recent INR's and Anticoagulation Dosing History:  Anticoagulation Dose History     Recent Dosing and Labs Latest Ref Rng & Units 9/2/2005 1/10/2008    INR 0.86 - 1.14 0.99 1.03        Most Recent 3 Troponin's:  Recent Labs   Lab Test 01/07/20  0244 01/06/20  1428 01/06/20  1126   TROPI 0.100* 0.093* 0.074*     Most Recent Cholesterol Panel:  Recent Labs   Lab Test 01/07/20  0244   CHOL 129   LDL 73   HDL 48*   TRIG 41     Most Recent 6 Bacteria Isolates From Any Culture (See EPIC Reports for Culture Details):No lab results found.  Most Recent TSH, T4 and A1c Labs:  Recent Labs   Lab Test 01/07/20  0244   TSH 2.62     Results for orders placed or performed during the hospital encounter of 01/06/20   CT Chest Pulmonary Embolism w Contrast    Narrative    CT CHEST PULMONARY EMBOLISM WITH CONTRAST   1/6/2020 7:52 AM     HISTORY: Shortness of breath. Evaluate for PE. Acute chest pain,  high  Dimer.    TECHNIQUE:  54 mL Isovue-370. Radiation dose for this scan was reduced  using automated exposure control, adjustment of the mA and/or kV  according to patient size, or iterative reconstruction technique.    COMPARISON: None.    FINDINGS:  No evidence of pulmonary embolism or acute thoracic aortic  abnormality. No pneumothorax. No pleural or pericardial effusion. No  pulmonary nodule or mass. No thoracic or axillary adenopathy.  Visualized portions of the upper abdomen are unremarkable. No  worrisome bone lesions.      Impression    IMPRESSION: Unremarkable CT scan of the chest. No evidence of  pulmonary embolism.    ANKIT POWERS MD   CT Angiogram coronary artery    Narrative    Procedure: CTA ANGIOGRAM CORONARY ARTERY     Examination Date: 1/7/2020 1:38 PM     Indication:  Chest pain.     Clinical Information: Chest pain, NSTEMI/ACS suspected, serial  troponin borderline     Ordering Provider: Dr. Palomino    Overall quality of the study: Suboptimal.     PROCEDURE: After obtaining informed consent,  ECG gated multi-slice  computed tomography of the heart  with and without intravenous  contrast  (Isovue-370, 210 cc, wasted 0 cc) was  performed on a  Siemens Dual Source Flash scanner without incident. Sublingual  Nitrostat 0.4 mg was given prior to scanning. Coronary artery calcium  score was performed using the Flash scanner protocol. CTA was  performed in the spinal mode at a heart rate of 69 bpm with 100 kVp.  Images were reconstructed and analyzed on a [employer] workstation.  Scan protocol was optimized to minimize radiation exposure. The total  radiation exposure including calcium score was calculated to be 375  DLP, and 5.25 mSv.      IMPRESSIONS:  Overall technically challenging suboptimal study due to poor  opacification and motion artifacts. The left main, proximal to mid  LAD, proximal circumflex, proximal to distal RCA vessels overall look  patent without detectable stenosis. However mid to  apical LAD is a  smaller caliber vessel, poorly opacified with motion artifact and  stenosis evaluation is suboptimal in this segment. After distal RCA no  PDA or AXEL vessels are visualized. The mid segment of obtuse marginal  1 and  second diagonal vessels have motion artifacts and stenosis  evaluation is suboptimal in these segments.    Discussed with Dr Palomino.    Total Agatston score 0,     Please review Radiology report for incidental noncardiac findings  that will follow separately.    FINDINGS:  CORONARY CALCIUM SCORE: The total Agatston calcium score is 0, Left  main: 0, left anterior descendin,  circumflex: 0, right coronary  artery: 0.     CORONARY ANGIOGRAPHY    DOMINANCE: Unknown.     LEFT MAIN:   The left main arises from the left coronary cusp.     The left main is normal without stenosis or plaque. ``      LEFT ANTERIOR DESCENDING ARTERY:     The proximal LAD appears patent without any detectable stenosis. It  gives rise to a small size diagonal vessel which is not  well-visualized due to poor opacification. The LAD then gives rise to  diagonal 2 vessel and beyond the origin of diagonal 2 vessel the LAD  appears 2 taper down to a smaller caliber vessel with significant  motion artifact in mid to distal LAD segments. Stenosis evaluation is  suboptimal in this segment. The second diagonal appears widely patent  in its proximal segment, the midsegment of second diagonal has  significant motion artifact and stenosis evaluation is suboptimal in  this segment.    LEFT CIRCUMFLEX ARTERY:     The proximal circumflex is widely patent without any detectable  stenosis.   The circumflex gives rise to a moderate size obtuse  marginal vessel which appears overall patent however there is a  segment of midportion of the obtuse marginal that has  significant  motion artifact and stenosis evaluation is suboptimal in this segment.  After the origin of obtuse marginal the left circumflex vessel is  not  well-visualized.    RIGHT CORONARY ARTERY:    The right coronary artery overall appears widely patent without any  significant stenosis from proximal to distal segment. However after  the distal RCA no further branching of the vessel into RPDA or RPLA  vessels are visualized.    ADDITIONAL FINDINGS:  The proximal ascending aorta is normal in size.   Normal pulmonary venous anatomy with all four pulmonary veins draining  into the left atrium.    There is no left ventricular mass or thrombus.   Normal pericardial thickness. There is no pericardial effusion.  The proximal pulmonary arteries are well opacified.  Please review Radiology report for incidental noncardiac findings that  will follow separately.    JAIRO NESBITT MD   Radiologist Consult For Cardiology    Narrative    EXAM: RADIOLOGIST CONSULT FOR CARDIOLOGY  LOCATION: [facility]  DATE/TIME: 2020 1:38 PM    INDICATION: Chest pain, NSTEMI/ACS suspected, serial troponin  borderline  TECHNIQUE: TECHNIQUE: Axial images were obtained through the heart  during the arterial phase of contrast enhancement. 2-D and 3-D  reconstructions were performed by the CT technologist.  CONTRAST: 210mL Isovue-370    FINDINGS:   Please note this report will focus on soft tissue findings. Please see  separate report for all cardiac and vascular findings.    No abnormally enlarged mediastinal lymph nodes. The visible solid  organs in the upper abdomen are unremarkable. No acute osseous  abnormality. No pleural effusion. No lung consolidation.    FACUDNO FOSS MD   Echocardiogram Complete    Narrative    [id number]  [id number]  [id number]  [id number]^CHRIS^LORI^SAGRARIO           [facility]  Echocardiography Laboratory  [address] [place], [place] [address]        Name: LIZANDRO BOURNE  MRN: [id number]  : 1985  Study Date: 2020 10:37 AM  Age: 34 yrs  Gender: Female  [facility]  Reason For Study: Chest Pain  Ordering  Physician: LORI PEREZ  Referring Physician: [facility]  Performed By: Vani Montelongo     BSA: 1.5 m2  Height: 63 in  Weight: 110 lb  BP: 125/88 mmHg  _____________________________________________________________________________  __        Procedure  Complete Portable Echo Adult. Optison (NDC #[id number]) given intravenously.  _____________________________________________________________________________  __        Interpretation Summary     The visual ejection fraction is estimated at 55-60%.  The right ventricle is normal in size and function.  No major valve disease.  Technically difficult study.  _____________________________________________________________________________  __        Left Ventricle  The left ventricle is normal in size. The visual ejection fraction is  estimated at 55-60%. Diastolic Doppler findings (E/E' ratio and/or other  parameters) suggest left ventricular filling pressures are normal.     Right Ventricle  The right ventricle is normal in size and function.     Atria  The left atrium is borderline dilated. The left atrial volume measurement is  unreliable due to technically difficult study. Right atrial size is normal.     Mitral Valve  The mitral valve leaflets are mildly thickened. There is trace mitral  regurgitation.        Tricuspid Valve  There is trace tricuspid regurgitation. Right ventricular systolic pressure  could not be approximated due to inadequate tricuspid regurgitation.     Aortic Valve  The aortic valve is trileaflet. No aortic regurgitation is present. No aortic  stenosis is present.     Pulmonic Valve  The pulmonic valve is not well visualized.     Vessels  The aortic root is normal size.     Pericardium  There is no pericardial effusion.     _____________________________________________________________________________  __  MMode/2D Measurements & Calculations  IVSd: 0.69 cm  LVIDd: 4.1 cm  LVIDs: 2.7 cm  LVPWd: 0.87 cm  FS: 35.3 %     LV mass(C)d: 94.9  grams  LV mass(C)dI: 63.3 grams/m2  LA dimension: 2.8 cm  asc Aorta Diam: 2.7 cm  LA Volume (BP): 35.0 ml  LA Volume Index (BP): 23.3 ml/m2  RWT: 0.42        Doppler Measurements & Calculations  MV E max norma: 85.9 cm/sec  MV A max norma: 101.0 cm/sec  MV E/A: 0.85  MV dec time: 0.22 sec     PA acc time: 0.20 sec  E/E' av.2  Lateral E/e': 5.9  Medial E/e': 10.5           _____________________________________________________________________________  __           Report approved by: Edgar Cruz 2020 01:02 PM      Cardiac Catheterization     Value    Cath EF Estimated 60    Narrative      Left ventricular filling pressures are mildly elevated .    The ejection fraction is greater than 55% by visual estimate.    Mid LAD lesion is 35% stenosed.    Prox RCA lesion is 10% stenosed.     1.  Only mild disease.  A smooth 35% mid to distal LAD stenosis.  The lady   does wraparound the apex of the heart.  2.  Left ventricular end-diastolic pressure of 17 mmHg.  Normal left   ventricular function.  No mitral regurgitation or aortic stenosis.  3.  Tiny arteriovenous malformation off of a septal  from the   right PDA.

## 2020-01-09 NOTE — TELEPHONE ENCOUNTER
Patient was evaluated by cardiology while inpatient for chest pain, elevated troponin, presyncope. 1/8/20: Coronary angiogram showed mild CAD-medical management only. Called patient to discuss any post hospital d/c questions she may have, review medication changes, and confirm f/u appts.  Patient denied any questions regarding new medications or changes with their PTA medications. Patient denied any SOB, chest pain, or light headedness. LRA cardiac cath site is without bleeding, swelling, redness or tenderness. Denies fever. RN confirmed with patient that she needs to schedule a f/u OV with her PMD in 7-10 days and have LRA access site evaluated. Patient advised to call clinic with any cardiac related questions or concerns prior to this neel't. Patient verbalized understanding and agreed with plan. BRANDI Garcia RN.

## (undated) DEVICE — CATH ANGIO JUDKINS R4 6FRX100CM INFINITI 534621T

## (undated) DEVICE — INTRO GLIDESHEATH SLENDER 6FR 10X45CM 60-1060

## (undated) DEVICE — MANIFOLD KIT ANGIO AUTOMATED 014613

## (undated) DEVICE — CATH ANGIO INFINITI PIGTAIL 145 6 SH 6FRX110CM  534-652S

## (undated) DEVICE — KIT HAND CONTROL ANGIOTOUCH ACIST 65CM AT-P65

## (undated) DEVICE — SLEEVE TR BAND RADIAL COMPRESSION DEVICE 24CM TRB24-REG

## (undated) DEVICE — CATH ANGIO JUDKINS JL3.5 6FRX100CM INFINITI 534618T

## (undated) DEVICE — TOTE ANGIO CORP PC15AT SAN32CC83O

## (undated) DEVICE — CATH ANGIO JUDKINS JL4 6FRX100CM INFINITI 534620T

## (undated) DEVICE — WIRE GUIDE 0.035"X260CM SAFE-T-J EXCHANGE G00517

## (undated) RX ORDER — VERAPAMIL HYDROCHLORIDE 2.5 MG/ML
INJECTION, SOLUTION INTRAVENOUS
Status: DISPENSED
Start: 2020-01-08

## (undated) RX ORDER — METOPROLOL TARTRATE 1 MG/ML
INJECTION, SOLUTION INTRAVENOUS
Status: DISPENSED
Start: 2020-01-07

## (undated) RX ORDER — NITROGLYCERIN 5 MG/ML
VIAL (ML) INTRAVENOUS
Status: DISPENSED
Start: 2020-01-08

## (undated) RX ORDER — LIDOCAINE HYDROCHLORIDE 10 MG/ML
INJECTION, SOLUTION EPIDURAL; INFILTRATION; INTRACAUDAL; PERINEURAL
Status: DISPENSED
Start: 2020-01-08

## (undated) RX ORDER — NITROGLYCERIN 0.4 MG/1
TABLET SUBLINGUAL
Status: DISPENSED
Start: 2020-01-07

## (undated) RX ORDER — HEPARIN SODIUM 1000 [USP'U]/ML
INJECTION, SOLUTION INTRAVENOUS; SUBCUTANEOUS
Status: DISPENSED
Start: 2020-01-08

## (undated) RX ORDER — HEPARIN SODIUM 200 [USP'U]/100ML
INJECTION, SOLUTION INTRAVENOUS
Status: DISPENSED
Start: 2020-01-08

## (undated) RX ORDER — FENTANYL CITRATE 50 UG/ML
INJECTION, SOLUTION INTRAMUSCULAR; INTRAVENOUS
Status: DISPENSED
Start: 2020-01-08